# Patient Record
Sex: MALE | Race: WHITE | NOT HISPANIC OR LATINO | Employment: FULL TIME | URBAN - METROPOLITAN AREA
[De-identification: names, ages, dates, MRNs, and addresses within clinical notes are randomized per-mention and may not be internally consistent; named-entity substitution may affect disease eponyms.]

---

## 2019-02-22 ENCOUNTER — TRANSFERRED RECORDS (OUTPATIENT)
Dept: HEALTH INFORMATION MANAGEMENT | Facility: CLINIC | Age: 54
End: 2019-02-22

## 2019-02-22 ENCOUNTER — HOSPITAL ENCOUNTER (OUTPATIENT)
Facility: CLINIC | Age: 54
Setting detail: OBSERVATION
Discharge: NURSING, REHAB OR LONG-TERM CARE | End: 2019-02-26
Attending: FAMILY MEDICINE | Admitting: FAMILY MEDICINE
Payer: COMMERCIAL

## 2019-02-22 ENCOUNTER — APPOINTMENT (OUTPATIENT)
Dept: CT IMAGING | Facility: CLINIC | Age: 54
End: 2019-02-22
Attending: FAMILY MEDICINE
Payer: COMMERCIAL

## 2019-02-22 DIAGNOSIS — R07.9 CHEST PAIN, UNSPECIFIED TYPE: ICD-10-CM

## 2019-02-22 DIAGNOSIS — R07.9 CHEST PAIN: Primary | ICD-10-CM

## 2019-02-22 DIAGNOSIS — F14.10 COCAINE ABUSE (H): ICD-10-CM

## 2019-02-22 DIAGNOSIS — F19.10 POLYSUBSTANCE ABUSE (H): ICD-10-CM

## 2019-02-22 DIAGNOSIS — E66.2 OBESITY HYPOVENTILATION SYNDROME (H): ICD-10-CM

## 2019-02-22 DIAGNOSIS — F10.930 ALCOHOL WITHDRAWAL, UNCOMPLICATED (H): ICD-10-CM

## 2019-02-22 LAB
ALBUMIN SERPL-MCNC: 3.7 G/DL (ref 3.4–5)
ALP SERPL-CCNC: 88 U/L (ref 40–150)
ALT SERPL W P-5'-P-CCNC: 78 U/L (ref 0–70)
AMPHETAMINES UR QL SCN: NEGATIVE
ANION GAP SERPL CALCULATED.3IONS-SCNC: 5 MMOL/L (ref 3–14)
AST SERPL W P-5'-P-CCNC: 39 U/L (ref 0–45)
BARBITURATES UR QL: POSITIVE
BASOPHILS # BLD AUTO: 0 10E9/L (ref 0–0.2)
BASOPHILS NFR BLD AUTO: 0.5 %
BENZODIAZ UR QL: NEGATIVE
BILIRUB SERPL-MCNC: 0.3 MG/DL (ref 0.2–1.3)
BUN SERPL-MCNC: 22 MG/DL (ref 7–30)
CALCIUM SERPL-MCNC: 8.8 MG/DL (ref 8.5–10.1)
CANNABINOIDS UR QL SCN: NEGATIVE
CHLORIDE SERPL-SCNC: 105 MMOL/L (ref 94–109)
CO2 SERPL-SCNC: 29 MMOL/L (ref 20–32)
COCAINE UR QL: POSITIVE
CREAT SERPL-MCNC: 1.26 MG/DL (ref 0.66–1.25)
D DIMER PPP FEU-MCNC: 0.4 UG/ML FEU (ref 0–0.5)
DIFFERENTIAL METHOD BLD: NORMAL
EOSINOPHIL # BLD AUTO: 0.4 10E9/L (ref 0–0.7)
EOSINOPHIL NFR BLD AUTO: 4.3 %
ERYTHROCYTE [DISTWIDTH] IN BLOOD BY AUTOMATED COUNT: 13.2 % (ref 10–15)
GFR SERPL CREATININE-BSD FRML MDRD: 65 ML/MIN/{1.73_M2}
GLUCOSE SERPL-MCNC: 160 MG/DL (ref 70–99)
HCT VFR BLD AUTO: 49.4 % (ref 40–53)
HGB BLD-MCNC: 16.4 G/DL (ref 13.3–17.7)
IMM GRANULOCYTES # BLD: 0 10E9/L (ref 0–0.4)
IMM GRANULOCYTES NFR BLD: 0.3 %
LIPASE SERPL-CCNC: 175 U/L (ref 73–393)
LYMPHOCYTES # BLD AUTO: 2.1 10E9/L (ref 0.8–5.3)
LYMPHOCYTES NFR BLD AUTO: 23.4 %
MCH RBC QN AUTO: 30.8 PG (ref 26.5–33)
MCHC RBC AUTO-ENTMCNC: 33.2 G/DL (ref 31.5–36.5)
MCV RBC AUTO: 93 FL (ref 78–100)
MONOCYTES # BLD AUTO: 1 10E9/L (ref 0–1.3)
MONOCYTES NFR BLD AUTO: 11.1 %
NEUTROPHILS # BLD AUTO: 5.4 10E9/L (ref 1.6–8.3)
NEUTROPHILS NFR BLD AUTO: 60.4 %
NRBC # BLD AUTO: 0 10*3/UL
NRBC BLD AUTO-RTO: 0 /100
NT-PROBNP SERPL-MCNC: 6 PG/ML (ref 0–900)
OPIATES UR QL SCN: POSITIVE
PCP UR QL SCN: NEGATIVE
PLATELET # BLD AUTO: 201 10E9/L (ref 150–450)
POTASSIUM SERPL-SCNC: 3.8 MMOL/L (ref 3.4–5.3)
PROT SERPL-MCNC: 7.3 G/DL (ref 6.8–8.8)
RBC # BLD AUTO: 5.32 10E12/L (ref 4.4–5.9)
SODIUM SERPL-SCNC: 139 MMOL/L (ref 133–144)
TROPONIN I SERPL-MCNC: <0.015 UG/L (ref 0–0.04)
WBC # BLD AUTO: 8.9 10E9/L (ref 4–11)

## 2019-02-22 PROCEDURE — 85379 FIBRIN DEGRADATION QUANT: CPT | Performed by: FAMILY MEDICINE

## 2019-02-22 PROCEDURE — 25000132 ZZH RX MED GY IP 250 OP 250 PS 637: Performed by: FAMILY MEDICINE

## 2019-02-22 PROCEDURE — 25000128 H RX IP 250 OP 636: Performed by: FAMILY MEDICINE

## 2019-02-22 PROCEDURE — 85025 COMPLETE CBC W/AUTO DIFF WBC: CPT | Performed by: FAMILY MEDICINE

## 2019-02-22 PROCEDURE — 80053 COMPREHEN METABOLIC PANEL: CPT | Performed by: FAMILY MEDICINE

## 2019-02-22 PROCEDURE — C9113 INJ PANTOPRAZOLE SODIUM, VIA: HCPCS | Performed by: FAMILY MEDICINE

## 2019-02-22 PROCEDURE — 74177 CT ABD & PELVIS W/CONTRAST: CPT

## 2019-02-22 PROCEDURE — 99285 EMERGENCY DEPT VISIT HI MDM: CPT | Mod: 25 | Performed by: FAMILY MEDICINE

## 2019-02-22 PROCEDURE — 80307 DRUG TEST PRSMV CHEM ANLYZR: CPT | Performed by: FAMILY MEDICINE

## 2019-02-22 PROCEDURE — 93005 ELECTROCARDIOGRAM TRACING: CPT | Performed by: FAMILY MEDICINE

## 2019-02-22 PROCEDURE — 96365 THER/PROPH/DIAG IV INF INIT: CPT | Performed by: FAMILY MEDICINE

## 2019-02-22 PROCEDURE — 93010 ELECTROCARDIOGRAM REPORT: CPT | Mod: Z6 | Performed by: FAMILY MEDICINE

## 2019-02-22 PROCEDURE — 84484 ASSAY OF TROPONIN QUANT: CPT | Performed by: FAMILY MEDICINE

## 2019-02-22 PROCEDURE — 83880 ASSAY OF NATRIURETIC PEPTIDE: CPT | Performed by: FAMILY MEDICINE

## 2019-02-22 PROCEDURE — 83690 ASSAY OF LIPASE: CPT | Performed by: FAMILY MEDICINE

## 2019-02-22 PROCEDURE — G0378 HOSPITAL OBSERVATION PER HR: HCPCS

## 2019-02-22 PROCEDURE — 96375 TX/PRO/DX INJ NEW DRUG ADDON: CPT | Performed by: FAMILY MEDICINE

## 2019-02-22 PROCEDURE — 96366 THER/PROPH/DIAG IV INF ADDON: CPT | Performed by: FAMILY MEDICINE

## 2019-02-22 PROCEDURE — 25000125 ZZHC RX 250: Performed by: FAMILY MEDICINE

## 2019-02-22 PROCEDURE — 71260 CT THORAX DX C+: CPT

## 2019-02-22 RX ORDER — SUCRALFATE ORAL 1 G/10ML
1 SUSPENSION ORAL ONCE
Status: COMPLETED | OUTPATIENT
Start: 2019-02-22 | End: 2019-02-22

## 2019-02-22 RX ORDER — NITROGLYCERIN 20 MG/100ML
.07-2 INJECTION INTRAVENOUS CONTINUOUS
Status: DISCONTINUED | OUTPATIENT
Start: 2019-02-22 | End: 2019-02-23

## 2019-02-22 RX ORDER — NITROGLYCERIN 0.4 MG/1
0.4 TABLET SUBLINGUAL EVERY 5 MIN PRN
Status: DISCONTINUED | OUTPATIENT
Start: 2019-02-22 | End: 2019-02-26 | Stop reason: HOSPADM

## 2019-02-22 RX ORDER — KETOROLAC TROMETHAMINE 15 MG/ML
15 INJECTION, SOLUTION INTRAMUSCULAR; INTRAVENOUS ONCE
Status: COMPLETED | OUTPATIENT
Start: 2019-02-22 | End: 2019-02-22

## 2019-02-22 RX ORDER — ACETAMINOPHEN 500 MG
1000 TABLET ORAL ONCE
Status: COMPLETED | OUTPATIENT
Start: 2019-02-22 | End: 2019-02-22

## 2019-02-22 RX ORDER — MORPHINE SULFATE 4 MG/ML
4 INJECTION, SOLUTION INTRAMUSCULAR; INTRAVENOUS
Status: DISCONTINUED | OUTPATIENT
Start: 2019-02-22 | End: 2019-02-23

## 2019-02-22 RX ORDER — IOPAMIDOL 755 MG/ML
100 INJECTION, SOLUTION INTRAVASCULAR ONCE
Status: COMPLETED | OUTPATIENT
Start: 2019-02-22 | End: 2019-02-22

## 2019-02-22 RX ADMIN — SUCRALFATE 1 G: 1 SUSPENSION ORAL at 22:08

## 2019-02-22 RX ADMIN — NITROGLYCERIN 0.07 MCG/KG/MIN: 20 INJECTION INTRAVENOUS at 22:21

## 2019-02-22 RX ADMIN — KETOROLAC TROMETHAMINE 15 MG: 15 INJECTION, SOLUTION INTRAMUSCULAR; INTRAVENOUS at 22:07

## 2019-02-22 RX ADMIN — MORPHINE SULFATE 4 MG: 4 INJECTION INTRAVENOUS at 22:34

## 2019-02-22 RX ADMIN — SODIUM CHLORIDE 100 ML: 9 INJECTION, SOLUTION INTRAVENOUS at 23:40

## 2019-02-22 RX ADMIN — ACETAMINOPHEN 1000 MG: 500 TABLET, FILM COATED ORAL at 21:55

## 2019-02-22 RX ADMIN — NITROGLYCERIN 0.4 MG: 0.4 TABLET SUBLINGUAL at 21:57

## 2019-02-22 RX ADMIN — IOPAMIDOL 100 ML: 755 INJECTION, SOLUTION INTRAVENOUS at 23:40

## 2019-02-22 RX ADMIN — PANTOPRAZOLE SODIUM 40 MG: 40 INJECTION, POWDER, FOR SOLUTION INTRAVENOUS at 23:26

## 2019-02-22 ASSESSMENT — ENCOUNTER SYMPTOMS
VOMITING: 0
CONSTIPATION: 0
COUGH: 0
DYSURIA: 0
SINUS PRESSURE: 0
HEADACHES: 1
FREQUENCY: 0
WHEEZING: 0
ABDOMINAL PAIN: 0
CHILLS: 0
PALPITATIONS: 0
BLOOD IN STOOL: 0
FEVER: 0
DIARRHEA: 0
SHORTNESS OF BREATH: 1
SORE THROAT: 0
DIAPHORESIS: 0
NAUSEA: 0

## 2019-02-22 ASSESSMENT — MIFFLIN-ST. JEOR: SCORE: 2412.54

## 2019-02-23 ENCOUNTER — APPOINTMENT (OUTPATIENT)
Dept: GENERAL RADIOLOGY | Facility: CLINIC | Age: 54
End: 2019-02-23
Attending: INTERNAL MEDICINE
Payer: COMMERCIAL

## 2019-02-23 PROBLEM — R07.9 CHEST PAIN: Status: ACTIVE | Noted: 2019-02-23

## 2019-02-23 LAB
GLUCOSE BLDC GLUCOMTR-MCNC: 136 MG/DL (ref 70–99)
GLUCOSE BLDC GLUCOMTR-MCNC: 157 MG/DL (ref 70–99)
GLUCOSE BLDC GLUCOMTR-MCNC: 170 MG/DL (ref 70–99)
HBA1C MFR BLD: 7.1 % (ref 0–5.6)
MRSA DNA SPEC QL NAA+PROBE: NEGATIVE
SPECIMEN SOURCE: NORMAL
TROPONIN I SERPL-MCNC: <0.015 UG/L (ref 0–0.04)
TROPONIN I SERPL-MCNC: <0.015 UG/L (ref 0–0.04)

## 2019-02-23 PROCEDURE — 93005 ELECTROCARDIOGRAM TRACING: CPT

## 2019-02-23 PROCEDURE — 83036 HEMOGLOBIN GLYCOSYLATED A1C: CPT | Performed by: FAMILY MEDICINE

## 2019-02-23 PROCEDURE — 25000132 ZZH RX MED GY IP 250 OP 250 PS 637: Performed by: FAMILY MEDICINE

## 2019-02-23 PROCEDURE — 36415 COLL VENOUS BLD VENIPUNCTURE: CPT | Performed by: FAMILY MEDICINE

## 2019-02-23 PROCEDURE — 84484 ASSAY OF TROPONIN QUANT: CPT | Performed by: FAMILY MEDICINE

## 2019-02-23 PROCEDURE — 87640 STAPH A DNA AMP PROBE: CPT | Mod: XU | Performed by: FAMILY MEDICINE

## 2019-02-23 PROCEDURE — 25000125 ZZHC RX 250: Performed by: INTERNAL MEDICINE

## 2019-02-23 PROCEDURE — 99218 ZZC INITIAL OBSERVATION CARE,LEVL I: CPT | Performed by: FAMILY MEDICINE

## 2019-02-23 PROCEDURE — 99207 ZZC CDG-HISTORY COMP: MEETS EXP. PROBLEM FOCUSED-DOWN CODED LACK OF ROS: CPT | Performed by: FAMILY MEDICINE

## 2019-02-23 PROCEDURE — 25000128 H RX IP 250 OP 636: Performed by: FAMILY MEDICINE

## 2019-02-23 PROCEDURE — 25000132 ZZH RX MED GY IP 250 OP 250 PS 637: Performed by: INTERNAL MEDICINE

## 2019-02-23 PROCEDURE — G0378 HOSPITAL OBSERVATION PER HR: HCPCS

## 2019-02-23 PROCEDURE — 87641 MR-STAPH DNA AMP PROBE: CPT | Performed by: FAMILY MEDICINE

## 2019-02-23 PROCEDURE — 25000131 ZZH RX MED GY IP 250 OP 636 PS 637: Performed by: FAMILY MEDICINE

## 2019-02-23 PROCEDURE — 00000146 ZZHCL STATISTIC GLUCOSE BY METER IP

## 2019-02-23 PROCEDURE — 71045 X-RAY EXAM CHEST 1 VIEW: CPT

## 2019-02-23 RX ORDER — ACETAMINOPHEN 325 MG/1
650 TABLET ORAL ONCE
Status: COMPLETED | OUTPATIENT
Start: 2019-02-23 | End: 2019-02-23

## 2019-02-23 RX ORDER — TRIAMTERENE AND HYDROCHLOROTHIAZIDE 37.5; 25 MG/1; MG/1
1 CAPSULE ORAL EVERY MORNING
Status: DISCONTINUED | OUTPATIENT
Start: 2019-02-23 | End: 2019-02-23 | Stop reason: CLARIF

## 2019-02-23 RX ORDER — MULTIPLE VITAMINS W/ MINERALS TAB 9MG-400MCG
1 TAB ORAL DAILY
Status: DISCONTINUED | OUTPATIENT
Start: 2019-02-23 | End: 2019-02-26 | Stop reason: HOSPADM

## 2019-02-23 RX ORDER — HYDROXYZINE HYDROCHLORIDE 25 MG/1
25 TABLET, FILM COATED ORAL 4 TIMES DAILY PRN
COMMUNITY
End: 2023-11-21

## 2019-02-23 RX ORDER — METOPROLOL SUCCINATE 25 MG/1
25 TABLET, EXTENDED RELEASE ORAL DAILY
Status: DISCONTINUED | OUTPATIENT
Start: 2019-02-23 | End: 2019-02-23

## 2019-02-23 RX ORDER — LISINOPRIL 40 MG/1
40 TABLET ORAL DAILY
Status: DISCONTINUED | OUTPATIENT
Start: 2019-02-23 | End: 2019-02-26 | Stop reason: HOSPADM

## 2019-02-23 RX ORDER — NITROGLYCERIN 0.4 MG/1
0.4 TABLET SUBLINGUAL EVERY 5 MIN PRN
COMMUNITY
End: 2023-11-21

## 2019-02-23 RX ORDER — NALOXONE HYDROCHLORIDE 0.4 MG/ML
.1-.4 INJECTION, SOLUTION INTRAMUSCULAR; INTRAVENOUS; SUBCUTANEOUS
Status: DISCONTINUED | OUTPATIENT
Start: 2019-02-23 | End: 2019-02-26 | Stop reason: HOSPADM

## 2019-02-23 RX ORDER — LORAZEPAM 2 MG/ML
1-2 INJECTION INTRAMUSCULAR EVERY 30 MIN PRN
Status: DISCONTINUED | OUTPATIENT
Start: 2019-02-23 | End: 2019-02-26 | Stop reason: HOSPADM

## 2019-02-23 RX ORDER — LANOLIN ALCOHOL/MO/W.PET/CERES
100 CREAM (GRAM) TOPICAL DAILY
Status: DISCONTINUED | OUTPATIENT
Start: 2019-02-23 | End: 2019-02-26 | Stop reason: HOSPADM

## 2019-02-23 RX ORDER — LORAZEPAM 1 MG/1
1-2 TABLET ORAL EVERY 30 MIN PRN
Status: DISCONTINUED | OUTPATIENT
Start: 2019-02-23 | End: 2019-02-26 | Stop reason: HOSPADM

## 2019-02-23 RX ORDER — PANTOPRAZOLE SODIUM 40 MG/1
40 TABLET, DELAYED RELEASE ORAL DAILY
COMMUNITY
End: 2023-11-21

## 2019-02-23 RX ORDER — LORAZEPAM 1 MG/1
1 TABLET ORAL DAILY
COMMUNITY
End: 2023-11-21

## 2019-02-23 RX ORDER — TRIAMTERENE AND HYDROCHLOROTHIAZIDE 37.5; 25 MG/1; MG/1
1 CAPSULE ORAL EVERY MORNING
COMMUNITY

## 2019-02-23 RX ORDER — FOLIC ACID 1 MG/1
1 TABLET ORAL DAILY
Status: DISCONTINUED | OUTPATIENT
Start: 2019-02-23 | End: 2019-02-26 | Stop reason: HOSPADM

## 2019-02-23 RX ORDER — ONDANSETRON 2 MG/ML
4 INJECTION INTRAMUSCULAR; INTRAVENOUS EVERY 6 HOURS PRN
Status: DISCONTINUED | OUTPATIENT
Start: 2019-02-23 | End: 2019-02-26 | Stop reason: HOSPADM

## 2019-02-23 RX ORDER — METOPROLOL SUCCINATE 25 MG/1
25 TABLET, EXTENDED RELEASE ORAL DAILY
Status: COMPLETED | OUTPATIENT
Start: 2019-02-23 | End: 2019-02-23

## 2019-02-23 RX ORDER — NICOTINE POLACRILEX 4 MG
15-30 LOZENGE BUCCAL
Status: DISCONTINUED | OUTPATIENT
Start: 2019-02-23 | End: 2019-02-26 | Stop reason: HOSPADM

## 2019-02-23 RX ORDER — ACETAMINOPHEN 325 MG/1
650 TABLET ORAL EVERY 6 HOURS PRN
COMMUNITY
End: 2023-11-21

## 2019-02-23 RX ORDER — TRIAMTERENE/HYDROCHLOROTHIAZID 37.5-25 MG
1 TABLET ORAL EVERY MORNING
Status: DISCONTINUED | OUTPATIENT
Start: 2019-02-23 | End: 2019-02-26 | Stop reason: HOSPADM

## 2019-02-23 RX ORDER — NALOXONE HYDROCHLORIDE 0.4 MG/ML
.1-.4 INJECTION, SOLUTION INTRAMUSCULAR; INTRAVENOUS; SUBCUTANEOUS
Status: DISCONTINUED | OUTPATIENT
Start: 2019-02-23 | End: 2019-02-23

## 2019-02-23 RX ORDER — ALBUTEROL SULFATE 90 UG/1
2 AEROSOL, METERED RESPIRATORY (INHALATION) EVERY 6 HOURS PRN
Status: DISCONTINUED | OUTPATIENT
Start: 2019-02-23 | End: 2019-02-26 | Stop reason: HOSPADM

## 2019-02-23 RX ORDER — IPRATROPIUM BROMIDE AND ALBUTEROL SULFATE 2.5; .5 MG/3ML; MG/3ML
3 SOLUTION RESPIRATORY (INHALATION) 4 TIMES DAILY PRN
Status: DISCONTINUED | OUTPATIENT
Start: 2019-02-23 | End: 2019-02-26 | Stop reason: HOSPADM

## 2019-02-23 RX ORDER — ACETAMINOPHEN 325 MG/1
650 TABLET ORAL 2 TIMES DAILY PRN
Status: DISCONTINUED | OUTPATIENT
Start: 2019-02-23 | End: 2019-02-26 | Stop reason: HOSPADM

## 2019-02-23 RX ORDER — LABETALOL HYDROCHLORIDE 5 MG/ML
20 INJECTION, SOLUTION INTRAVENOUS EVERY 4 HOURS PRN
Status: DISCONTINUED | OUTPATIENT
Start: 2019-02-23 | End: 2019-02-26 | Stop reason: HOSPADM

## 2019-02-23 RX ORDER — METOPROLOL SUCCINATE 25 MG/1
TABLET, EXTENDED RELEASE ORAL DAILY
Status: ON HOLD | COMMUNITY
End: 2019-02-23

## 2019-02-23 RX ORDER — LANOLIN ALCOHOL/MO/W.PET/CERES
100 CREAM (GRAM) TOPICAL DAILY
COMMUNITY
End: 2023-11-21

## 2019-02-23 RX ORDER — LISINOPRIL 40 MG/1
40 TABLET ORAL DAILY
COMMUNITY

## 2019-02-23 RX ORDER — IBUPROFEN 400 MG/1
400 TABLET, FILM COATED ORAL ONCE
Status: COMPLETED | OUTPATIENT
Start: 2019-02-23 | End: 2019-02-23

## 2019-02-23 RX ORDER — PHENOBARBITAL 32.4 MG/1
97.2 TABLET ORAL
Status: ON HOLD | COMMUNITY
End: 2019-02-26

## 2019-02-23 RX ORDER — HYDROMORPHONE HYDROCHLORIDE 1 MG/ML
0.3 INJECTION, SOLUTION INTRAMUSCULAR; INTRAVENOUS; SUBCUTANEOUS
Status: DISCONTINUED | OUTPATIENT
Start: 2019-02-23 | End: 2019-02-23

## 2019-02-23 RX ORDER — DIPHENHYDRAMINE HCL 25 MG
25 CAPSULE ORAL
Status: DISCONTINUED | OUTPATIENT
Start: 2019-02-23 | End: 2019-02-26 | Stop reason: HOSPADM

## 2019-02-23 RX ORDER — IPRATROPIUM BROMIDE AND ALBUTEROL SULFATE 2.5; .5 MG/3ML; MG/3ML
SOLUTION RESPIRATORY (INHALATION)
Status: DISCONTINUED
Start: 2019-02-23 | End: 2019-02-23 | Stop reason: HOSPADM

## 2019-02-23 RX ORDER — ALBUTEROL SULFATE 90 UG/1
2 AEROSOL, METERED RESPIRATORY (INHALATION) EVERY 6 HOURS PRN
COMMUNITY
End: 2023-11-21

## 2019-02-23 RX ORDER — ONDANSETRON 4 MG/1
4 TABLET, ORALLY DISINTEGRATING ORAL EVERY 6 HOURS PRN
Status: DISCONTINUED | OUTPATIENT
Start: 2019-02-23 | End: 2019-02-26 | Stop reason: HOSPADM

## 2019-02-23 RX ORDER — DEXTROSE MONOHYDRATE 25 G/50ML
25-50 INJECTION, SOLUTION INTRAVENOUS
Status: DISCONTINUED | OUTPATIENT
Start: 2019-02-23 | End: 2019-02-26 | Stop reason: HOSPADM

## 2019-02-23 RX ADMIN — LORAZEPAM 2 MG: 1 TABLET ORAL at 08:53

## 2019-02-23 RX ADMIN — NITROGLYCERIN 0.07 MCG/KG/MIN: 20 INJECTION INTRAVENOUS at 02:43

## 2019-02-23 RX ADMIN — LORAZEPAM 2 MG: 2 INJECTION INTRAMUSCULAR; INTRAVENOUS at 18:12

## 2019-02-23 RX ADMIN — METOPROLOL SUCCINATE 25 MG: 25 TABLET, EXTENDED RELEASE ORAL at 07:56

## 2019-02-23 RX ADMIN — FOLIC ACID 1 MG: 1 TABLET ORAL at 07:56

## 2019-02-23 RX ADMIN — Medication 1 MG: at 22:12

## 2019-02-23 RX ADMIN — LORAZEPAM 1 MG: 1 TABLET ORAL at 07:56

## 2019-02-23 RX ADMIN — DIPHENHYDRAMINE HYDROCHLORIDE 25 MG: 25 CAPSULE ORAL at 22:12

## 2019-02-23 RX ADMIN — ACETAMINOPHEN 650 MG: 325 TABLET, FILM COATED ORAL at 06:29

## 2019-02-23 RX ADMIN — INSULIN ASPART 1 UNITS: 100 INJECTION, SOLUTION INTRAVENOUS; SUBCUTANEOUS at 12:06

## 2019-02-23 RX ADMIN — ACETAMINOPHEN 650 MG: 325 TABLET, FILM COATED ORAL at 18:53

## 2019-02-23 RX ADMIN — TRIAMTERENE AND HYDROCHLOROTHIAZIDE 1 TABLET: 37.5; 25 TABLET ORAL at 09:21

## 2019-02-23 RX ADMIN — LISINOPRIL 40 MG: 40 TABLET ORAL at 07:56

## 2019-02-23 RX ADMIN — Medication 100 MG: at 07:56

## 2019-02-23 RX ADMIN — IBUPROFEN 400 MG: 400 TABLET ORAL at 09:21

## 2019-02-23 RX ADMIN — MULTIPLE VITAMINS W/ MINERALS TAB 1 TABLET: TAB at 07:55

## 2019-02-23 RX ADMIN — IPRATROPIUM BROMIDE AND ALBUTEROL SULFATE 3 ML: .5; 3 SOLUTION RESPIRATORY (INHALATION) at 18:30

## 2019-02-23 ASSESSMENT — ACTIVITIES OF DAILY LIVING (ADL)
ADLS_ACUITY_SCORE: 11
RETIRED_COMMUNICATION: 0-->UNDERSTANDS/COMMUNICATES WITHOUT DIFFICULTY
FALL_HISTORY_WITHIN_LAST_SIX_MONTHS: NO
TOILETING: 0-->INDEPENDENT
ADLS_ACUITY_SCORE: 10
ADLS_ACUITY_SCORE: 11
TRANSFERRING: 0-->INDEPENDENT
SWALLOWING: 0-->SWALLOWS FOODS/LIQUIDS WITHOUT DIFFICULTY
DRESS: 0-->INDEPENDENT
RETIRED_EATING: 0-->INDEPENDENT
COGNITION: 0 - NO COGNITION ISSUES REPORTED
AMBULATION: 0-->INDEPENDENT
BATHING: 0-->INDEPENDENT

## 2019-02-23 ASSESSMENT — PAIN DESCRIPTION - DESCRIPTORS
DESCRIPTORS: POUNDING
DESCRIPTORS: HEADACHE
DESCRIPTORS: HEADACHE
DESCRIPTORS: ACHING

## 2019-02-23 ASSESSMENT — MIFFLIN-ST. JEOR: SCORE: 2405

## 2019-02-23 NOTE — PROGRESS NOTES
[facility] [facility] ADMISSION NOTE    Patient admitted to room 5 at approximately 0115 via cart from emergency room. Patient was accompanied by nurse.     Verbal SBAR report received from Sina YEN in ER prior to patient arrival.     Patient trasferred to bed via self. Patient alert and oriented X 3. Pain is not well controlled.  Medication(s) being used: narcotic analgesics including morphine. 0-10 Pain Scale: 3. Admission vital signs: Blood pressure 124/85, pulse 78, temperature 97.8  F (36.6  C), temperature source Oral, resp. rate 15, height 1.829 m (6'), weight (!) 151.2 kg (333 lb 5.4 oz), SpO2 98 %. Patient was oriented to plan of care, call light, bed controls, tv, telephone, bathroom and visiting hours.     Risk Assessment    The following safety risks were identified during admission: fall. Yellow risk band applied: YES.     Skin Initial Assessment    This writer admitted this patient and completed a full skin assessment and Flo score in the Adult PCS flowsheet. Appropriate interventions initiated as needed.     Secondary skin check completed by Crispin Bryant RN.    Flo Risk Assessment  Sensory Perception: 4-->no impairment  Moisture: 3-->occasionally moist  Activity: 3-->walks occasionally  Mobility: 3-->slightly limited  Nutrition: 3-->adequate  Friction and Shear: 3-->no apparent problem  Flo Score: 19    Maricruz Cronin RN

## 2019-02-23 NOTE — PROGRESS NOTES
Return call from Dr Boogie: turn off Nitroglycerin drip and give 650mg Tylenol PO once for headache. If not resolving, may give 0.3mg IV Dilaudid Q 2 hours for headache.

## 2019-02-23 NOTE — PLAN OF CARE
Afebrile, room air. Audible few second pauses in breath with sleep, patient states he has sleep apnea but does not use his cpap at home. Had complaints of severe headache shortly after arrival to unit with denial of chest pain; order from Dr Boogie to stop drip and given Tylenol to see if HA improves. HA improved and half hour later patient was feeling short of breath and pressure in chest, lung sounds clear and vitals stable. Nitroglycerin drip restarted on lowest dose at 0245; chest pressure resolved and no further complaints of HA at this time. Troponin level being rechecked this morning. Sinus rhythm on cardiac monitors throughout the night. Patient tolerating regular diet. Up at bedside with SBA. His plan upon discharge will be to return to [facility]; states he is committed to rehab this time.

## 2019-02-23 NOTE — ED NOTES
From [place] with c/o sob and chest pain. Started at 1700 tonight. Ems gave 4 81mg asa and 4 ntg sl sprays. Rates pressure is 7/10.

## 2019-02-23 NOTE — PROGRESS NOTES
Patient complains of pounding headache, lightheaded and dizzy. Denies chest pain at this time. Paging Dr Boogie for Tylenol order and to see if able to try turning off Nitroglycerin drip. Awaiting orders

## 2019-02-23 NOTE — UTILIZATION REVIEW
"    Admission Status; Secondary Review Determination      Under the authority of the Utilization Management Committee, the utilization review process indicated a secondary review on the above patient. The review outcome is based on review of the medical records, discussions with staff, and applying clinical experience noted on the date of the review.      (X) Observation Status Appropriate - This patient does not meet hospital inpatient criteria and is placed in observation status. If this patient's primary payer is Medicare and was admitted as an inpatient, Condition Code 44 should be used and patient status changed to \"observation\".      RATIONALE FOR DETERMINATION:   53-year-old male with a history of hypertension, gastritis, obstructive sleep apnea with noncompliance of CPAP, obesity hypoventilation, impaired fasting glucose, ascending aortic aneurysm and previous hypertension, hypertensive emergency.  The patient has been using cocaine and alcohol heavily.  He presented to [facility] for treatment and several hours after arriving he developed chest pain.  He was seen in the [place] ER with work up including normal ECG, normal troponin enzyme, and unremarkable chest CT scan.  He was treated with a nitroglycerin gtt in the ER, which was subsequently stopped on admission.  Urine tox screen notable for presence of cocaine and opiates (as well as barbiturates, which he received at [facility] prior to presentation)         The severity of illness, intensity of service provided, expected LOS and risk for adverse outcome make the care appropriate for further observation; however, doesn't meet criteria for hospital inpatient admission. This was communicated to Dr. Boogie who concurred with this determination.     The information on this document is developed by the utilization review team in order for the business office to ensure compliance. This only denotes the appropriateness of proper admission status and does not " reflect the quality of care rendered.      The definitions of Inpatient Status and Observation Status used in making the determination above are those provided in the CMS Coverage Manual, Chapter 1 and Chapter 6, section 70.4.      Sincerely,      Carlos Perez MD  Physician Advisor  Utilization Management  [facility]

## 2019-02-23 NOTE — PROVIDER NOTIFICATION
Dr. Chuyita molina. Pt c/o increased shortness of breath at rest, starting to have chest discomfort not pain but pressure. Headache increasing, medicated with prn tylenol and prn ativan with no change. CIWA 8 mostly for headache. Vital signs:  Temp: 98.1  F (36.7  C) Temp src: Oral BP: (!) 136/92 Pulse: 81 Heart Rate: 71 Resp: 8 SpO2: 98 % RA.   Nitroglycerin drip currently off. Waiting for return call.

## 2019-02-23 NOTE — PROGRESS NOTES
"Patient states HA gone since nitroglycerin drip off; however feeling short of breath and pressure when taking deep breaths. Asked patient about history of asthma in chart from [place], states \"I had an inhaler that was 3 years old so I threw it away, it was only for if I had a wheeze at night time.\" Asked if he has sleep apnea, states \"yes, but I don't use my CPAP.\"  Nitroglycerin drip restarted at lowest dose at 2:44AM. Will continue to monitor closely.   "

## 2019-02-23 NOTE — H&P
Admitted:     02/22/2019      CHIEF COMPLAINT:  Chest pressure and shortness of breath starting 8 hours after admission to [facility].      HISTORY OF PRESENT ILLNESS:  Mikey Sanders is a 53-year-old male with a history of hypertension, gastritis, obstructive sleep apnea with noncompliance of CPAP, obesity hypoventilation, impaired fasting glucose, ascending aortic aneurysm and previous hypertension, hypertensive emergency.  The patient has been using cocaine and alcohol heavily.  He has been through treatment once in the past 4-5 years ago in [place].  He lives in [place].  He has a strong family history of cardiac disease.  He decided that he wanted to get off chemicals.  He presented to [facility] 48 hours after admission.  He developed dyspnea and chest pressure, some occasional sweats but no nausea.  He had been given phenobarbital.  He was brought to the emergency room.      In the emergency room, he had a normal chest x-ray.  Troponins have been normal.  His pain responded to nitroglycerin.  He was put on a nitroglycerin drip and admitted.  So far his troponins are normal.  During the night, he developed a headache.  His nitroglycerin drip was on the level, but it was turned off.  The headache resolved, but he developed more feelings of shortness of breath.  The nitroglycerin was turned back on and he thought that that helped.  He is currently symptom-free but he does have a lot of anxiety.  His tox screen was positive for opiates, barbiturates and cocaine.  He also reports heavy drinking.  At this point in time, we are turning off the nitroglycerin and going to observe the patient.  He had a CT done upon admission that showed no dissection.  There was no report of aneurysm on that, although he does report this.      PAST MEDICAL HISTORY:      1.  Gastritis 4/esophagitis.     2.  Acute kidney injury.     3.  Left facial numbness.     4.  Headaches.      5.  Glucose intolerance.     6.  Panic.      7.   Obesity hypoventilation.      8.  Obstructive sleep apnea.  He reports he is not using the CPAP but has used it in the past.      9.  Ascending aortic aneurysm.      10.  Obesity.       11.  Essential hypertension.      12.  Hypertensive emergency.      13.  Unspecified iridocyclitis.      14.  Glaucoma.     15.  Carpal tunnel syndrome.     16.  Costal chondritis.      PAST SURGICAL HISTORY:  Includes some type of abdominal surgery, appendectomy, right hand fracture surgeries, hernia repair, joint replacement, tonsillectomy, elbow surgery, upper GI endoscopy.      FAMILY MEDICAL HISTORY:  Father with Barnard's esophagus.  Brother with Barnard's esophagus.  All grandparents with coronary disease.      SOCIAL HISTORY:  Lives in [place].  He has children.  He is not .  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      HABITS:  He was drinking heavily up until Tuesday of this last week.  He was using cocaine up until Tuesday of this last week, which was about 3-4 days ago.      ALLERGIES:  NKA.          CODE STATUS:  Full.         MEDICATIONS:      1.  Lisinopril 40 mg daily.      2.  Ativan 1 mg daily.      3.  Metoprolol succinate 25 mg daily.      4.  Dyazide 37.5/25 one daily.      5.  Albuterol HFA 2 puffs q.6h. p.r.n.      REVIEW OF SYSTEMS:  He has had headache when the nitroglycerin has been on.  No other head and neck symptoms.  He does report anxiety.  He has had shortness of breath, recent dyspnea on exertion and dyspnea on exertion with stair climbing in the last week.  He has had low level chest pressure in the central chest.  He did report it to be 7/8 in intensity in the emergency room.  No abdominal symptoms.  No GERD symptoms.  No urinary tract symptoms.  No skin symptoms.  He has right-hand symptoms from fracture and fracture surgeries there.      PHYSICAL EXAMINATION:      GENERAL:  He is pleasant, obese, alert.      VITAL SIGNS:  Temperature is 98, pulse 81, sinus, blood pressure 136/92, respiratory  rate 12, sat 98%.  He seems somewhat nervous.      HEENT:  Ears negative.  Oral negative.  Eyes:  Pupils round, react to light.      NECK:  Supple.  There are no meningeal signs.   LUNGS:  Clear to auscultation.      CARDIOVASCULAR:  S1, S2, without click, rub or murmur.      ABDOMEN:  Obese, soft, benign, nontender, without guarding, rebound, rigidity or mass.  Genitalia grossly normal.      EXTREMITIES:  Trace pitting edema.      NEUROLOGIC:  Cranial nerves, motor, reflexes all within normal limits.      LABORATORY DATA:  EKG was normal.  CBC normal, white count 8.9, hemoglobin 16.4, platelet count 201,000.  Comprehensive metabolic panel notable for glucose of 116, a creatinine of 1.26, both troponins less than 0.015.  D-dimer 0.4.  BNP was 6.  Drug abuse screen positive for barbiturates (presumably from phenobarbital given at [facility]), cocaine and opiates.  Hemoglobin A1c is pending.         ASSESSMENT:   1.  Chest pain in a patient with multiple risk factors.  The chest pain has been relieved with nitroglycerin.  Differential diagnosis includes ischemic coronary disease, cocaine chest pain syndrome, chest pain related to polysubstance drug abuse and withdrawal syndrome, anxiety and gastrointestinal related chest pain.      2.  History of hypertension.      3.  History of thoracic aneurysm -- not documented on our CT.      4.  Elevated glucose in a patient with a history of glucose intolerance.      5.  Obesity.      6.  Polysubstance drug abuse.       7.  Sleep apnea -- not compliant with CPAP use.      8.  Elevated ALT -- in part related to alcohol use.      9.  Chronic kidney disease, baseline creatinine has been between 1.2 and 1.8.         PLAN:  The patient needs to stay in the hospital because he has had ongoing chest pain.  It has been nitroglycerin sensitive.  At this time, I am going to turn the nitroglycerin back off.  It has been causing him a headache.  I am going to watch him.  I think he may need  a stress test before he discharges.  The differential for the chest pain is broad and includes ischemic disease, chest pain related to cocaine use, chest pain related to polysubstance use, withdrawal, anxiety related chest pain and GI related chest pain.  He does have risk factors and the pain has been nitroglycerin sensitive so I think he will probably need to stay here until he can have a stress test which would be on Monday.  I am going to put him on a CIWA protocol and give him vitamins.  Put him on an insulin order set and will obtain an A1c.  Will recheck a troponin tomorrow.         EMERY MYLES MD             D: 2019   T: 2019   MT: RADHA      Name:     GALINA COFFEY   MRN:      [id number]        Account:      [id number]   :      1965        Admitted:     2019                   Document: [id number]

## 2019-02-23 NOTE — ED PROVIDER NOTES
History     Chief Complaint   Patient presents with     Chest Pain     Shortness of Breath     HPI  Mikey Sanders is a 53 year old male who presents from [facility] with anterior chest pain moderate to severe intensity 7-8 out of 10 without radiation into his back jaw or arms.  There is associated shortness of breath, no nausea vomiting.  Some sweats .  He also notes dyspnea on exertion that is been ongoing for the last couple of days.  No history of VT E or risk factors.  History of a known thoracic aortic aneurysm of 3.9 cm.  No known diabetes heart disease lung disease hyperlipidemia  Is have a history of hypertension.    Denies recent prolonged travel (>3 hours) by car or plane, history or FHx of venous thromboembolism, recent surgery (last 4 weeks), active cancer history, hypercoagulable state, estrogen or other medications/conditions causing VTE or  new unilateral swelling or pain in the legs or calves.    Alcohol withdrawal protocol, on phenobarbital.      Resents here with moderate headache related to the nitroglycerin given in transport.    With history of alcohol abuse and cocaine abuse.  Last use was 2/22/2019.  Uses up to 750 mL vodka couple times per week.      Allergies:  No Known Allergies    Problem List:    There are no active problems to display for this patient.       Past Medical History:    Past Medical History:   Diagnosis Date     Essential hypertension        Past Surgical History:    Past Surgical History:   Procedure Laterality Date     ELBOW SURGERY      Humerus/Elbow Procedure-ulnar nerve repair     FINGER SURGERY      Hand/Finger Procedure-x4       Family History:    No family history on file.    Social History:  Marital Status:   [2]  Social History     Tobacco Use     Smoking status: Never Smoker     Smokeless tobacco: Never Used   Substance Use Topics     Alcohol use: Yes     Alcohol/week: 0.0 oz     Comment: Alcoholic Drinks/day: very rare; once a year     Drug use:  Unknown     Types: Other     Comment: Drug use: Not Asked        Medications:      No current outpatient medications on file.      Review of Systems   Constitutional: Negative for chills, diaphoresis and fever.   HENT: Negative for ear pain, sinus pressure and sore throat.    Eyes: Negative for visual disturbance.   Respiratory: Positive for shortness of breath. Negative for cough and wheezing.    Cardiovascular: Positive for chest pain. Negative for palpitations.   Gastrointestinal: Negative for abdominal pain, blood in stool, constipation, diarrhea, nausea and vomiting.   Genitourinary: Negative for dysuria, frequency and urgency.   Skin: Negative for rash.   Neurological: Positive for headaches.   All other systems reviewed and are negative.        Physical Exam   BP: (!) 165/107  Pulse: 90  Temp: 98.3  F (36.8  C)  Resp: 22  SpO2: 94 %      Physical Exam   Constitutional: He is oriented to person, place, and time. He appears distressed.   HENT:   Mouth/Throat: Oropharynx is clear and moist.   Neck: Neck supple.   Cardiovascular: Normal rate, regular rhythm and normal heart sounds. Exam reveals no friction rub.   No murmur heard.  Pulmonary/Chest: Effort normal and breath sounds normal. No stridor. No respiratory distress. He has no wheezes.   Abdominal: Soft. Bowel sounds are normal. He exhibits no distension. There is no tenderness. There is no guarding.   Musculoskeletal: He exhibits no edema.   Neurological: He is alert and oriented to person, place, and time. No cranial nerve deficit or sensory deficit. He exhibits normal muscle tone. Coordination normal.   Skin: No rash noted. He is not diaphoretic. No pallor.       ED Course        Procedures                 EKG Interpretation:      Interpreted by Carlos Alamo MD  EKG done at 2131 hrs. demonstrates a sinus rhythm at 85 bpm with a leftward axis and no ST change.  No T wave changes.  Normal R progression and no Q waves.  Normal intervals.  Normal  conduction.  No ectopy.  Impression sinus rhythm at 85 bpm and no old EKG to compare.      Critical Care time:  none               Results for orders placed or performed during the hospital encounter of 02/22/19 (from the past 24 hour(s))   CBC with platelets, differential   Result Value Ref Range    WBC 8.9 4.0 - 11.0 10e9/L    RBC Count 5.32 4.4 - 5.9 10e12/L    Hemoglobin 16.4 13.3 - 17.7 g/dL    Hematocrit 49.4 40.0 - 53.0 %    MCV 93 78 - 100 fl    MCH 30.8 26.5 - 33.0 pg    MCHC 33.2 31.5 - 36.5 g/dL    RDW 13.2 10.0 - 15.0 %    Platelet Count 201 150 - 450 10e9/L    Diff Method Automated Method     % Neutrophils 60.4 %    % Lymphocytes 23.4 %    % Monocytes 11.1 %    % Eosinophils 4.3 %    % Basophils 0.5 %    % Immature Granulocytes 0.3 %    Nucleated RBCs 0 0 /100    Absolute Neutrophil 5.4 1.6 - 8.3 10e9/L    Absolute Lymphocytes 2.1 0.8 - 5.3 10e9/L    Absolute Monocytes 1.0 0.0 - 1.3 10e9/L    Absolute Eosinophils 0.4 0.0 - 0.7 10e9/L    Absolute Basophils 0.0 0.0 - 0.2 10e9/L    Abs Immature Granulocytes 0.0 0 - 0.4 10e9/L    Absolute Nucleated RBC 0.0    Comprehensive metabolic panel   Result Value Ref Range    Sodium 139 133 - 144 mmol/L    Potassium 3.8 3.4 - 5.3 mmol/L    Chloride 105 94 - 109 mmol/L    Carbon Dioxide 29 20 - 32 mmol/L    Anion Gap 5 3 - 14 mmol/L    Glucose 160 (H) 70 - 99 mg/dL    Urea Nitrogen 22 7 - 30 mg/dL    Creatinine 1.26 (H) 0.66 - 1.25 mg/dL    GFR Estimate 65 >60 mL/min/[1.73_m2]    GFR Estimate If Black 76 >60 mL/min/[1.73_m2]    Calcium 8.8 8.5 - 10.1 mg/dL    Bilirubin Total 0.3 0.2 - 1.3 mg/dL    Albumin 3.7 3.4 - 5.0 g/dL    Protein Total 7.3 6.8 - 8.8 g/dL    Alkaline Phosphatase 88 40 - 150 U/L    ALT 78 (H) 0 - 70 U/L    AST 39 0 - 45 U/L   Troponin I   Result Value Ref Range    Troponin I ES <0.015 0.000 - 0.045 ug/L   D dimer quantitative   Result Value Ref Range    D Dimer 0.4 0.0 - 0.50 ug/ml FEU   Nt probnp inpatient   Result Value Ref Range    N-Terminal  Pro BNP Inpatient 6 0 - 900 pg/mL   Lipase   Result Value Ref Range    Lipase 175 73 - 393 U/L   Drug Screen Urine   Result Value Ref Range    Amphetamine Qual Urine Negative NEG^Negative    Barbiturates Qual Urine Positive (A) NEG^Negative    Benzodiazepine Qual Urine Negative NEG^Negative    Cannabinoids Qual Urine Negative NEG^Negative    Cocaine Qual Urine Positive (A) NEG^Negative    Opiates Qualitative Urine Positive (A) NEG^Negative    PCP Qual Urine Negative NEG^Negative   CT Aortic Survey w Contrast    Narrative    CT AORTIC SURVEY W CONTRAST 2/22/2019 11:53 PM    HISTORY: History of aortic aneurysm. Severe anterior chest pain.    COMPARISON: None.    TECHNIQUE:  Chest, abdomen and pelvis CT was performed following  intravenous administration of 100 mL Isovue -370.  Radiation dose for  this scan was reduced using automated exposure control, adjustment of  the mA and/or kV according to patient size, or iterative  reconstruction technique.    FINDINGS:     CHEST: No evidence of aortic dissection. No evidence of segmental or  subsegmental pulmonary embolus. Normal heart size. No pericardial  effusion. No adenopathy. No airspace consolidation, pleural effusion  or pneumothorax.    ABDOMEN: No evidence of aortic dissection. Liver, gallbladder, spleen,  pancreas, left adrenal gland and kidneys appear normal. Small right  adrenal nodule probably represents benign adenoma.    PELVIS: Caliber of the iliac vasculature is normal. Bowel has normal  caliber. There are multiple prominent iliac and inguinal lymph nodes;  for reference a right iliac node measures 2.9 cm in short axis (image  195, series 5).      Impression    IMPRESSION:   1. No evidence of aortic dissection.  2. No evidence of pulmonary embolus.  3. No acute abnormality in the abdomen/pelvis.  4. Enlarged iliac and inguinal lymph nodes of uncertain clinical  significance.    TARA PUGA MD       Medications   acetaminophen (TYLENOL) tablet 1,000 mg (not  administered)   nitroGLYcerin (NITROSTAT) sublingual tablet 0.4 mg (not administered)   morphine (PF) injection 4 mg (not administered)   sucralfate (CARAFATE) suspension 1 g (not administered)       Assessments & Plan (with Medical Decision Making)     MDM: Mikey Sanders is a 53 year old male who presents from [facility] after being there for only approximately 8 hours with onset at approximately 1700 hrs. of anterior chest pain without radiation associated dyspnea.  He tells me that his last cocaine use was approximately 18 February but he is still positive on his urine drug screen for this.  He also notes his last alcohol use was approximately 18th as well.  He has been on withdrawal protocol there including with barbiturates.  He has a history of a thoracic aortic aneurysm of 3.9 cm.  He also has been experiencing associated dyspnea and sweats.  There is been an increased sensation of dyspnea on exertion.  He had no known history of VT E or such risk factors and a d-dimer was negative.  He was sent to CT of the chest abdomen pelvis for an aortic study that demonstrated no obvious dissection.  His troponin is done in approximately 5 hours of pain and it is normal.  His EKG demonstrates no acute changes.  On his presentation he had received aspirin 324 mg in the ambulance.  Nitroglycerin had been briefly helpful in the ambulance.  This however gave him a headache.  On his presentation to the emergency department he was also given nitroglycerin with bring his pain from a 7 to a 3.  He was started on a nitroglycerin infusion while awaiting further testing.    At this point his differential diagnosis includes coronary syndrome, pulmonary embolism, aortic dissection, pneumonia, gastroesophageal reflux disease, pancreatitis, reactive airway disease.  I discussed with the patient my concerns for cocaine induced coronary syndrome I therefore asked that he be observed overnight with serial troponins.  Would recommend  weaning of the nitroglycerin as possible and he will be in a monitored bed in the ICU for this.  I have also started GI prophylaxis with Protonix and given a test dose of Carafate.  I discussed with Bill and hospitalist service will except for observation.         ED to Inpatient Handoff:    Discussed with Bill  Patient accepted for Observation Stay  Pending studies include CTA aorta study  Code Status: Full Code           I have reviewed the nursing notes.    I have reviewed the findings, diagnosis, plan and need for follow up with the patient.          Medication List      There are no discharge medications for this visit.         Final diagnoses:   Chest pain   Cocaine abuse (H)   Alcohol withdrawal, uncomplicated (H)       2/22/2019   [facility] EMERGENCY DEPARTMENT     Carlos Alamo MD  02/23/19 0134

## 2019-02-23 NOTE — ED NOTES
"Patient has  [place] to Observation  order. Patient has been given the Observation brochure -  What does Observation mean to me.\"  Patient has been given the opportunity to ask questions about observation status and their plan of care.      Sara Hitchcock    "

## 2019-02-24 LAB
ANION GAP SERPL CALCULATED.3IONS-SCNC: 5 MMOL/L (ref 3–14)
BUN SERPL-MCNC: 19 MG/DL (ref 7–30)
CALCIUM SERPL-MCNC: 8.4 MG/DL (ref 8.5–10.1)
CHLORIDE SERPL-SCNC: 107 MMOL/L (ref 94–109)
CO2 SERPL-SCNC: 28 MMOL/L (ref 20–32)
CREAT SERPL-MCNC: 1.04 MG/DL (ref 0.66–1.25)
ERYTHROCYTE [DISTWIDTH] IN BLOOD BY AUTOMATED COUNT: 13.2 % (ref 10–15)
GFR SERPL CREATININE-BSD FRML MDRD: 83 ML/MIN/{1.73_M2}
GLUCOSE BLDC GLUCOMTR-MCNC: 120 MG/DL (ref 70–99)
GLUCOSE BLDC GLUCOMTR-MCNC: 132 MG/DL (ref 70–99)
GLUCOSE BLDC GLUCOMTR-MCNC: 141 MG/DL (ref 70–99)
GLUCOSE BLDC GLUCOMTR-MCNC: 174 MG/DL (ref 70–99)
GLUCOSE SERPL-MCNC: 153 MG/DL (ref 70–99)
HCT VFR BLD AUTO: 46.1 % (ref 40–53)
HGB BLD-MCNC: 15.4 G/DL (ref 13.3–17.7)
MCH RBC QN AUTO: 31.2 PG (ref 26.5–33)
MCHC RBC AUTO-ENTMCNC: 33.4 G/DL (ref 31.5–36.5)
MCV RBC AUTO: 93 FL (ref 78–100)
PLATELET # BLD AUTO: 154 10E9/L (ref 150–450)
POTASSIUM SERPL-SCNC: 4.1 MMOL/L (ref 3.4–5.3)
RBC # BLD AUTO: 4.94 10E12/L (ref 4.4–5.9)
SODIUM SERPL-SCNC: 140 MMOL/L (ref 133–144)
TROPONIN I SERPL-MCNC: <0.015 UG/L (ref 0–0.04)
WBC # BLD AUTO: 6.5 10E9/L (ref 4–11)

## 2019-02-24 PROCEDURE — 36415 COLL VENOUS BLD VENIPUNCTURE: CPT | Performed by: FAMILY MEDICINE

## 2019-02-24 PROCEDURE — 25000128 H RX IP 250 OP 636: Performed by: INTERNAL MEDICINE

## 2019-02-24 PROCEDURE — 25000132 ZZH RX MED GY IP 250 OP 250 PS 637: Performed by: FAMILY MEDICINE

## 2019-02-24 PROCEDURE — 00000146 ZZHCL STATISTIC GLUCOSE BY METER IP

## 2019-02-24 PROCEDURE — 25000125 ZZHC RX 250: Performed by: INTERNAL MEDICINE

## 2019-02-24 PROCEDURE — 85027 COMPLETE CBC AUTOMATED: CPT | Performed by: FAMILY MEDICINE

## 2019-02-24 PROCEDURE — G0378 HOSPITAL OBSERVATION PER HR: HCPCS

## 2019-02-24 PROCEDURE — 80048 BASIC METABOLIC PNL TOTAL CA: CPT | Performed by: FAMILY MEDICINE

## 2019-02-24 PROCEDURE — 25000132 ZZH RX MED GY IP 250 OP 250 PS 637: Performed by: INTERNAL MEDICINE

## 2019-02-24 PROCEDURE — 84484 ASSAY OF TROPONIN QUANT: CPT | Performed by: FAMILY MEDICINE

## 2019-02-24 PROCEDURE — 99226 ZZC SUBSEQUENT OBSERVATION CARE,LEVEL III: CPT | Performed by: FAMILY MEDICINE

## 2019-02-24 RX ADMIN — PROCHLORPERAZINE EDISYLATE 10 MG: 5 INJECTION INTRAMUSCULAR; INTRAVENOUS at 18:08

## 2019-02-24 RX ADMIN — FOLIC ACID 1 MG: 1 TABLET ORAL at 07:46

## 2019-02-24 RX ADMIN — IPRATROPIUM BROMIDE AND ALBUTEROL SULFATE 3 ML: .5; 3 SOLUTION RESPIRATORY (INHALATION) at 16:42

## 2019-02-24 RX ADMIN — IPRATROPIUM BROMIDE AND ALBUTEROL SULFATE 3 ML: .5; 3 SOLUTION RESPIRATORY (INHALATION) at 06:41

## 2019-02-24 RX ADMIN — Medication 100 MG: at 07:46

## 2019-02-24 RX ADMIN — LORAZEPAM 1 MG: 1 TABLET ORAL at 16:43

## 2019-02-24 RX ADMIN — INSULIN ASPART 1 UNITS: 100 INJECTION, SOLUTION INTRAVENOUS; SUBCUTANEOUS at 08:36

## 2019-02-24 RX ADMIN — ACETAMINOPHEN 650 MG: 325 TABLET, FILM COATED ORAL at 06:42

## 2019-02-24 RX ADMIN — TRIAMTERENE AND HYDROCHLOROTHIAZIDE 1 TABLET: 37.5; 25 TABLET ORAL at 07:46

## 2019-02-24 RX ADMIN — MULTIPLE VITAMINS W/ MINERALS TAB 1 TABLET: TAB at 07:46

## 2019-02-24 RX ADMIN — ACETAMINOPHEN 650 MG: 325 TABLET, FILM COATED ORAL at 16:43

## 2019-02-24 RX ADMIN — LISINOPRIL 40 MG: 40 TABLET ORAL at 07:45

## 2019-02-24 RX ADMIN — LORAZEPAM 2 MG: 1 TABLET ORAL at 18:08

## 2019-02-24 NOTE — PROVIDER NOTIFICATION
Dr. Rm called to discuss pt's status. This writer requesting more tylenol for pt's headache; tylenol fell off the MAR. MD ordered BID prn tylenol because of pt's elevated ALT. No NSAIDs because of gastric issues. Pt sitting at bedside after neb and ativan starting calm down somewhat. Waiting for xray. Pt updated on plan.

## 2019-02-24 NOTE — PROVIDER NOTIFICATION
Patient with noted audible expiratory wheezing , anxiety with H/A of 10/10 blood pressure 143/109 sat's 100% room air Patient had been in lounge on Internet when this started.

## 2019-02-24 NOTE — PROGRESS NOTES
Cross cover     Called by nurse as pt had ongoing c/o sob. She said he was wheezing which was new. Ordered duonebs and rechecked cxr which was normal.    Nichelle Rm

## 2019-02-24 NOTE — PROGRESS NOTES
[facility]      Subjective:  Pt reports low grade chest tightness and ongoing difficulty breathing and ashraf.  He is anxious.  No swelling  No fever chills  Nurses reported wheezing-he thought neb helped some    Review of Systems:  CONSTITUTIONAL: NEGATIVE for fever, chills, change in weight  INTEGUMENTARY/SKIN: NEGATIVE for worrisome rashes, moles or lesions  EYES: NEGATIVE for vision changes or irritation  ENT/MOUTH: NEGATIVE for ear, mouth and throat problems  RESP:above  BREAST: NEGATIVE for masses, tenderness or discharge  CV: above  GI: NEGATIVE for nausea, abdominal pain, heartburn, or change in bowel habits  : NEGATIVE for frequency, dysuria, or hematuria  MUSCULOSKELETAL: NEGATIVE for significant arthralgias or myalgia  NEURO: NEGATIVE for weakness, dizziness or paresthesias  ENDOCRINE: NEGATIVE for temperature intolerance, skin/hair changes  HEME: NEGATIVE for bleeding problems  PSYCHIATRIC: anxious  Physical Exam:  Vitals Were Reviewed    Patient Vitals for the past 16 hrs:   BP Temp Temp src Pulse Heart Rate Resp SpO2   02/24/19 0120 128/79 98  F (36.7  C) Oral 75 -- 20 97 %   02/24/19 0000 -- -- -- 76 70 -- --   02/23/19 1951 119/88 97.7  F (36.5  C) Oral 77 -- -- 95 %   02/23/19 1800 (!) 133/105 -- -- -- -- 24 97 %   02/23/19 1512 -- -- -- -- 65 -- --   02/23/19 1500 123/79 98.2  F (36.8  C) Oral 68 -- 22 95 %         Intake/Output Summary (Last 24 hours) at 2/24/2019 0626  Last data filed at 2/23/2019 2000  Gross per 24 hour   Intake 1092.9 ml   Output 750 ml   Net 342.9 ml       GENERAL APPEARANCE: anxious  EYES: conjunctiva clear, eyes grossly normal  RESP: I don't here wheezing, with force exhalation I here some noise that seems to be generated in the upper airway  CV: regular rate and rhythm, normal S1 S2, no S3 or S4 and no murmur, click or rub   ABDOMEN: soft, nontender, no HSM or masses and bowel sounds normal  MS: no clubbing, cyanosis; no edema  SKIN: clear without  significant rashes or lesions    Lab:  Recent Labs   Lab Test 02/24/19  0523 02/22/19  2136    139   POTASSIUM 4.1 3.8   CHLORIDE 107 105   CO2 28 29   ANIONGAP 5 5   * 160*   BUN 19 22   CR 1.04 1.26*   ISA 8.4* 8.8     CBC RESULTS:   Recent Labs   Lab Test 02/24/19  0523 02/22/19  2136   WBC 6.5 8.9   RBC 4.94 5.32   HGB 15.4 16.4   HCT 46.1 49.4    201       Results for orders placed or performed during the hospital encounter of 02/22/19 (from the past 24 hour(s))   Glucose by meter   Result Value Ref Range    Glucose 170 (H) 70 - 99 mg/dL   Troponin I   Result Value Ref Range    Troponin I ES <0.015 0.000 - 0.045 ug/L   Glucose by meter   Result Value Ref Range    Glucose 136 (H) 70 - 99 mg/dL   XR Chest Port 1 View    Narrative    XR CHEST PORT 1 VW  2/23/2019 7:26 PM     HISTORY:  cough sob    COMPARISON: None.    FINDINGS:  Heart and pulmonary vasculature are normal. The lungs are  clear.      Impression    IMPRESSION: No active alveolar-type infiltrates.    GERMÁN DIAS MD   Glucose by meter   Result Value Ref Range    Glucose 157 (H) 70 - 99 mg/dL   Glucose by meter   Result Value Ref Range    Glucose 141 (H) 70 - 99 mg/dL   Basic metabolic panel   Result Value Ref Range    Sodium 140 133 - 144 mmol/L    Potassium 4.1 3.4 - 5.3 mmol/L    Chloride 107 94 - 109 mmol/L    Carbon Dioxide 28 20 - 32 mmol/L    Anion Gap 5 3 - 14 mmol/L    Glucose 153 (H) 70 - 99 mg/dL    Urea Nitrogen 19 7 - 30 mg/dL    Creatinine 1.04 0.66 - 1.25 mg/dL    GFR Estimate 83 >60 mL/min/[1.73_m2]    GFR Estimate If Black >90 >60 mL/min/[1.73_m2]    Calcium 8.4 (L) 8.5 - 10.1 mg/dL   CBC with platelets   Result Value Ref Range    WBC 6.5 4.0 - 11.0 10e9/L    RBC Count 4.94 4.4 - 5.9 10e12/L    Hemoglobin 15.4 13.3 - 17.7 g/dL    Hematocrit 46.1 40.0 - 53.0 %    MCV 93 78 - 100 fl    MCH 31.2 26.5 - 33.0 pg    MCHC 33.4 31.5 - 36.5 g/dL    RDW 13.2 10.0 - 15.0 %    Platelet Count 154 150 - 450 10e9/L   Troponin I    Result Value Ref Range    Troponin I ES <0.015 0.000 - 0.045 ug/L       Assessment and Plan:    Chest pain/shortness of breath in a patient with multiple cardiac risk factors.  The chest pain has been relieved with nitroglycerin.  Differential diagnosis includes ischemic coronary disease, cocaine chest pain syndrome, chest pain related to polysubstance drug abuse and withdrawal syndrome, anxiety and gastrointestinal related chest pain    Currently pt has neg troponins, normal ekg's, chest CT and CXR.  Pt will stay for a stress test and echo tomorrow. The etiology of the symptoms could be related to polysubstance use/withdrawal.  Nurses report possible wheezing-but I hear only some upper airway that seemed more like vocal cord dysfunction.      History of hypertension      History of thoracic aneurysm -- not documented on our CT.        Elevated  glucose in a patient with a history of glucose intolerance--apparent new diagnosis of diabetes mellitus type 2  HgbA1c is 7.1--will monitor blood sugars and refer to primary care post hospital. Insulin order set in place.    Obesity    Polysubstance drug abuse.   CIWA in place        Sleep apnea -- not compliant with CPAP use.        Elevated ALT -- in part related to alcohol use.       Chronic kidney disease, baseline creatinine has been between 1.2 and 1.8.       current creat 1.04    Prophylaxis-ambulate  FEN-no iv fluids    PLAN:    The differential for the chest pain/sob is broad and includes ischemic disease, chest pain related to cocaine use, chest pain related to polysubstance use withdrawal, anxiety related chest pain and GI related chest pain.  He does have risk factors and the pain has been possibly nitroglycerin sensitive so I think he will probably need to stay here until he can have a stress test tomorrow. I will also order an echo.I will also order exercise oximetry. He doesn't drop his sat while going to the bathroom though.    Given the neg studies and  trops there is a high probability that the symptoms are non cardiac.I think there is some chance that symptoms would resolve with further time off drugs.    He will need more out pt eval of the new dx of diabetes mellitus type 2.

## 2019-02-24 NOTE — PROGRESS NOTES
Pt suddenly called with difficulty catching his breath, chest pressure and really bad anxiety. Expiratory wheezes throughout O2 sats 96 on RA. Pt has a tight congested cough which is new, wheezing in lungs are also new. Dr. Jag molina, nebs requested. Pt currently getting a neb. Treated with ativan per CIWA=10. Will re-assess.

## 2019-02-24 NOTE — PROGRESS NOTES
Pt is doing well this morning, still anxious but improving. Headache is gone. Pt showered and ambulated in halls with RT. Insulin coverage for breakfast. No chest pain.

## 2019-02-24 NOTE — PLAN OF CARE
A&Ox4, VSS on RA. LS dim but clear, exp wheezing at times, [place] but sats remain in high 90s with activity. Continues to have mild, intermittent, non-radiating midsternal chest pressure-states this is much improved from before. Remains anxious, but CIWAs minimal overnight-no ativan given.Plan is for stress test prior to discharge

## 2019-02-24 NOTE — PROGRESS NOTES
Pt has been assessed for Home Oxygen needs    * RA at rest Pulse oximetry SpO2 95%  *RA during activity/exercise SpO2 95 %  Pt does not currently qualify for home O2.

## 2019-02-25 ENCOUNTER — APPOINTMENT (OUTPATIENT)
Dept: NUCLEAR MEDICINE | Facility: CLINIC | Age: 54
End: 2019-02-25
Attending: FAMILY MEDICINE
Payer: COMMERCIAL

## 2019-02-25 ENCOUNTER — APPOINTMENT (OUTPATIENT)
Dept: CARDIOLOGY | Facility: CLINIC | Age: 54
End: 2019-02-25
Attending: FAMILY MEDICINE
Payer: COMMERCIAL

## 2019-02-25 LAB
ANION GAP SERPL CALCULATED.3IONS-SCNC: 6 MMOL/L (ref 3–14)
BUN SERPL-MCNC: 16 MG/DL (ref 7–30)
CALCIUM SERPL-MCNC: 9 MG/DL (ref 8.5–10.1)
CHLORIDE SERPL-SCNC: 106 MMOL/L (ref 94–109)
CO2 SERPL-SCNC: 27 MMOL/L (ref 20–32)
CREAT SERPL-MCNC: 1.15 MG/DL (ref 0.66–1.25)
ERYTHROCYTE [DISTWIDTH] IN BLOOD BY AUTOMATED COUNT: 13.1 % (ref 10–15)
GFR SERPL CREATININE-BSD FRML MDRD: 73 ML/MIN/{1.73_M2}
GLUCOSE BLDC GLUCOMTR-MCNC: 106 MG/DL (ref 70–99)
GLUCOSE BLDC GLUCOMTR-MCNC: 131 MG/DL (ref 70–99)
GLUCOSE BLDC GLUCOMTR-MCNC: 139 MG/DL (ref 70–99)
GLUCOSE BLDC GLUCOMTR-MCNC: 151 MG/DL (ref 70–99)
GLUCOSE SERPL-MCNC: 145 MG/DL (ref 70–99)
HCT VFR BLD AUTO: 49.3 % (ref 40–53)
HGB BLD-MCNC: 16.2 G/DL (ref 13.3–17.7)
MCH RBC QN AUTO: 30.7 PG (ref 26.5–33)
MCHC RBC AUTO-ENTMCNC: 32.9 G/DL (ref 31.5–36.5)
MCV RBC AUTO: 93 FL (ref 78–100)
PLATELET # BLD AUTO: 176 10E9/L (ref 150–450)
POTASSIUM SERPL-SCNC: 4.1 MMOL/L (ref 3.4–5.3)
RBC # BLD AUTO: 5.28 10E12/L (ref 4.4–5.9)
SODIUM SERPL-SCNC: 139 MMOL/L (ref 133–144)
TROPONIN I SERPL-MCNC: <0.015 UG/L (ref 0–0.04)
WBC # BLD AUTO: 6.4 10E9/L (ref 4–11)

## 2019-02-25 PROCEDURE — 40000275 ZZH STATISTIC RCP TIME EA 10 MIN

## 2019-02-25 PROCEDURE — 93018 CV STRESS TEST I&R ONLY: CPT | Performed by: INTERNAL MEDICINE

## 2019-02-25 PROCEDURE — 99225 ZZC SUBSEQUENT OBSERVATION CARE,LEVEL II: CPT | Performed by: INTERNAL MEDICINE

## 2019-02-25 PROCEDURE — 93017 CV STRESS TEST TRACING ONLY: CPT

## 2019-02-25 PROCEDURE — 78452 HT MUSCLE IMAGE SPECT MULT: CPT

## 2019-02-25 PROCEDURE — 93306 TTE W/DOPPLER COMPLETE: CPT | Mod: 26 | Performed by: INTERNAL MEDICINE

## 2019-02-25 PROCEDURE — 25000125 ZZHC RX 250: Performed by: INTERNAL MEDICINE

## 2019-02-25 PROCEDURE — 99207 ZZC CDG-CODE CATEGORY CHANGED: CPT | Performed by: INTERNAL MEDICINE

## 2019-02-25 PROCEDURE — 25000132 ZZH RX MED GY IP 250 OP 250 PS 637: Performed by: INTERNAL MEDICINE

## 2019-02-25 PROCEDURE — 80048 BASIC METABOLIC PNL TOTAL CA: CPT | Performed by: FAMILY MEDICINE

## 2019-02-25 PROCEDURE — 78452 HT MUSCLE IMAGE SPECT MULT: CPT | Mod: 26 | Performed by: INTERNAL MEDICINE

## 2019-02-25 PROCEDURE — 40000264 ECHOCARDIOGRAM COMPLETE

## 2019-02-25 PROCEDURE — 85027 COMPLETE CBC AUTOMATED: CPT | Performed by: FAMILY MEDICINE

## 2019-02-25 PROCEDURE — 25500064 ZZH RX 255 OP 636: Performed by: INTERNAL MEDICINE

## 2019-02-25 PROCEDURE — 84484 ASSAY OF TROPONIN QUANT: CPT | Performed by: FAMILY MEDICINE

## 2019-02-25 PROCEDURE — 93016 CV STRESS TEST SUPVJ ONLY: CPT | Performed by: FAMILY MEDICINE

## 2019-02-25 PROCEDURE — 00000146 ZZHCL STATISTIC GLUCOSE BY METER IP

## 2019-02-25 PROCEDURE — 34300033 ZZH RX 343: Performed by: INTERNAL MEDICINE

## 2019-02-25 PROCEDURE — 36415 COLL VENOUS BLD VENIPUNCTURE: CPT | Performed by: FAMILY MEDICINE

## 2019-02-25 PROCEDURE — A9502 TC99M TETROFOSMIN: HCPCS | Performed by: INTERNAL MEDICINE

## 2019-02-25 PROCEDURE — G0378 HOSPITAL OBSERVATION PER HR: HCPCS

## 2019-02-25 PROCEDURE — 25000132 ZZH RX MED GY IP 250 OP 250 PS 637: Performed by: FAMILY MEDICINE

## 2019-02-25 RX ORDER — PANTOPRAZOLE SODIUM 40 MG/1
40 TABLET, DELAYED RELEASE ORAL DAILY
Status: DISCONTINUED | OUTPATIENT
Start: 2019-02-25 | End: 2019-02-26 | Stop reason: HOSPADM

## 2019-02-25 RX ADMIN — Medication 1 MG: at 20:27

## 2019-02-25 RX ADMIN — PANTOPRAZOLE SODIUM 40 MG: 40 TABLET, DELAYED RELEASE ORAL at 13:11

## 2019-02-25 RX ADMIN — RANITIDINE 150 MG: 150 TABLET ORAL at 20:23

## 2019-02-25 RX ADMIN — ACETAMINOPHEN 650 MG: 325 TABLET, FILM COATED ORAL at 20:27

## 2019-02-25 RX ADMIN — FOLIC ACID 1 MG: 1 TABLET ORAL at 08:47

## 2019-02-25 RX ADMIN — TRIAMTERENE AND HYDROCHLOROTHIAZIDE 1 TABLET: 37.5; 25 TABLET ORAL at 08:52

## 2019-02-25 RX ADMIN — RANITIDINE 150 MG: 150 TABLET ORAL at 13:11

## 2019-02-25 RX ADMIN — HUMAN ALBUMIN MICROSPHERES AND PERFLUTREN 2 ML: 10; .22 INJECTION, SOLUTION INTRAVENOUS at 09:46

## 2019-02-25 RX ADMIN — Medication 31.4 MCI.: at 12:30

## 2019-02-25 RX ADMIN — MULTIPLE VITAMINS W/ MINERALS TAB 1 TABLET: TAB at 08:47

## 2019-02-25 RX ADMIN — Medication 100 MG: at 08:48

## 2019-02-25 RX ADMIN — LISINOPRIL 40 MG: 40 TABLET ORAL at 08:48

## 2019-02-25 RX ADMIN — IPRATROPIUM BROMIDE AND ALBUTEROL SULFATE 3 ML: .5; 3 SOLUTION RESPIRATORY (INHALATION) at 15:39

## 2019-02-25 NOTE — PROGRESS NOTES
Patient reports of having wheezing again. Nurse is hearing wheezing stethoscope. earlier lungs where clear. Requested neb, had one yesterday when this happened and states it helped.

## 2019-02-25 NOTE — PROGRESS NOTES
Patient now resting comfortably sleeping after possible severe anxiety episode. Patient received lorazepam 2 mg po and IV compazine after describing a severe headache with pain in jaw, forehead, chest.

## 2019-02-25 NOTE — PROGRESS NOTES
Denies chest pain. CIWA=0. Awaiting stress test. Aware stress test will be conducted over 2 days based on weight restrictions. Uses call light to notify staff with questions.

## 2019-02-25 NOTE — PLAN OF CARE
No chest pain reported. Continues to deny nausea, HA, anxiety. CIWA scores 0. Sleeping well between cares. /77 (BP Location: Right arm)   Pulse 87   Temp 96.6  F (35.9  C) (Oral)   Resp 18   Ht 1.829 m (6')   Wt (!) 151.2 kg (333 lb 5.4 oz)   SpO2 94%   BMI 45.21 kg/m

## 2019-02-25 NOTE — PROGRESS NOTES
Skin affirmation note    Admitting nurse completed full skin assessment, Flo score and Flo interventions. This writer agrees with the initial skin assessment findings.

## 2019-02-25 NOTE — PROGRESS NOTES
"[facility] Medicine Progress Note  Date of Service: 02/25/2019    Assessment & Plan   Mikey Sanders is a 53 year old male who presented on 2/22/2019 with chest pressure and shortness of breath starting 8 hours after admission to LTAC, [facility].     Chest pain/shortness of breath     Patient with multiple cardiac risk factors.  The chest pain has been relieved with nitroglycerin.  Differential diagnosis includes ischemic coronary disease, cocaine chest pain syndrome, chest pain related to withdrawal syndrome, anxiety. Thus far, neg troponins, normal ekg's, normal chest CT and CXR.    Continues to have occasional chest heaviness and shortness of breath at rest, not exertional. Nursing reported wheezes but upper airway breath sounds on MD exam on admission consistent with vocal cord dysfunction. Echo normal today 2/25. Treadmill stress today reportedly with dyspnea on exertion that was more than expected but no chest pain. Due to his size, the resting images will be obtained tomorrow.    - continue to treat shortness of breath with nebs as needed   - if develops persistent symptoms, recheck ECG and troponin I series.     Hypertension    Some mild increased BP.    - no change today, continue lisinopril and hydrochlorothiazide/triamterene      History of thoracic aneurysm     Mild aortic root dilatation and moderate ascending aorta dilatation on echo this admission.    - continue outpatient follow up       Diabetes mellitus type 2    Previously told mild elevation that was being watched. HgbA1c is 7.1 which is consistent with diabetes mellitus. BG have needed very little insulin this admission.    - outpatient follow up with PCP    Obesity    Patient intends to try weight loss as part of his recovery from substance abuse.     Polysubstance drug abuse    Cocaine and alcohol predominantly. Admits to being \"sneaky\" about it and few people around him were aware. He says it was his choice " to pursue treatment. He is motivated to return to [facility] on discharge from hospital.     No evidence of significant withdrawal.        Sleep apnea -- not compliant with CPAP use.        Elevated ALT -- in part related to alcohol use.       Chronic kidney disease, baseline creatinine has been between 1.2 and 1.8.       current creat 1.04    Prophylaxis-ambulate  FEN-no iv fluids    Discussion: Stress test to take 2 days.    Disposition: Anticipate discharge tomorrow if stress test negative. Will return to [facility].      Attestation:  Total time: 35 minutes    Shukri Richards MD  [facility] Medicine        Interval History   HPI reviewed. Has occasional episodes where his chest is tight and has a hard time exhaling, feels shortness of breath. These occur with rest. Had symptoms last night for a few minutes. No cough. No nausea, diaphoresis. Also c/o increased dyspnea on exertion that is consistent with activity and has been gradual in onset over past year.     No hallucinations. Occasionally feeling anxious, especially with the dyspnea events.     Wants to return to treatment.     Physical Exam   Temp:  [96.6  F (35.9  C)-98.2  F (36.8  C)] 98.2  F (36.8  C)  Pulse:  [71-98] 83  Heart Rate:  [71-98] 98  Resp:  [16-20] 18  BP: (120-157)/(66-91) 157/91  SpO2:  [92 %-96 %] 96 %    Weights:   Vitals:    02/22/19 2209 02/23/19 0115   Weight: 136.1 kg (300 lb) (!) 151.2 kg (333 lb 5.4 oz)    Body mass index is 45.21 kg/m .    Constitutional: alert and oriented, NAD, nontoxic  CV: Regular, trace bilateral lower extremity edema, no murmur  Respiratory: CTA bilaterally, mild increased resp effort after walk in padilla but not in distress  GI: Soft, obese, non-tender, bowel sounds present  Skin: Warm and dry    Data   Recent Labs   Lab 02/25/19  0544 02/24/19  0523 02/23/19  1200  02/22/19  2136   WBC 6.4 6.5  --   --  8.9   HGB 16.2 15.4  --   --  16.4   MCV 93 93  --   --  93    154  --   --  201    140  --   --  139    POTASSIUM 4.1 4.1  --   --  3.8   CHLORIDE 106 107  --   --  105   CO2 27 28  --   --  29   BUN 16 19  --   --  22   CR 1.15 1.04  --   --  1.26*   ANIONGAP 6 5  --   --  5   ISA 9.0 8.4*  --   --  8.8   * 153*  --   --  160*   ALBUMIN  --   --   --   --  3.7   PROTTOTAL  --   --   --   --  7.3   BILITOTAL  --   --   --   --  0.3   ALKPHOS  --   --   --   --  88   ALT  --   --   --   --  78*   AST  --   --   --   --  39   LIPASE  --   --   --   --  175   TROPI <0.015 <0.015 <0.015   < > <0.015    < > = values in this interval not displayed.       Recent Labs   Lab 02/25/19  1639 02/25/19  0658 02/25/19  0544 02/25/19  0154 02/24/19  2148 02/24/19  1745 02/24/19  1138 02/24/19  0523  02/22/19  2136   GLC  --   --  145*  --   --   --   --  153*  --  160*   * 131*  --  151* 174* 120* 132*  --    < >  --     < > = values in this interval not displayed.        Unresulted Labs Ordered in the Past 30 Days of this Admission     No orders found from 12/24/2018 to 2/23/2019.           Imaging: No results found for this or any previous visit (from the past 24 hour(s)).     I reviewed all new labs and imaging results over the last 24 hours. I personally reviewed no images or EKG's today.    Medications       folic acid  1 mg Oral Daily     insulin aspart  1-7 Units Subcutaneous TID AC     insulin aspart  1-5 Units Subcutaneous At Bedtime     lisinopril  40 mg Oral Daily     multivitamin w/minerals  1 tablet Oral Daily     pantoprazole  40 mg Oral Daily     ranitidine  150 mg Oral BID     triamterene-HCTZ  1 tablet Oral QAM     vitamin B1  100 mg Oral Daily   Reviewed meds    Shukri Richards MD  [facility] Medicine

## 2019-02-25 NOTE — PROGRESS NOTES
Exercise portion of the NM stress test performed today. The pt was able to exercise 8 minutes 20 seconds on the rodrick protocol with c/o severe dyspnea as a limiting factor. No chest pain. Dyspnea resolved at 2 minutes recovery. No ischemia EKG changes noted.     Pt will have a resting portion of the test done tomorrow 2/26 approx 11 am.

## 2019-02-25 NOTE — PLAN OF CARE
Patient had an episode requiring physician intervention possibly a anxiety issue. Patient feeling better now. Patient awaiting stress echo in AM.

## 2019-02-25 NOTE — PROGRESS NOTES
Pt assessment requested by RN due to pt needing prn neb. While talking with patient, he was observed to have a WOB at 5/10 (where he couldn't finish sentences without taking a breath, RR at 24). Some late, light expiratory wheezing were heard over the RML, RLL, and LLL. No wheezing was heard over areas of the neck. Pt reports regular amount of phlegm. 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 Also, he does get SOB with exertion (such as climbing stairs). He states that his oxygen is always at least 95% at check-ups, but this wheezing appears to inconsistent/intermittent. He reports getting PFT's done. However, staff is unable to see or find these records in Care Everywhere. RT assured patient that he would be checked on again to see how his breathing has improved.

## 2019-02-25 NOTE — PROGRESS NOTES
[facility] TRANSPORT NOTE  Data:   Reason for Transport:  Moved to [facility] from ICU    Joon Sickler was transported to 2306 via wheel chair at 2130.  Patient was accompanied by Nursing Assistant. Equipment used for transport: Cardiac monitor . Family was aware of reason for transport: yes    Action:  Report: received from Mindi YEN     Response:  Patient's condition upon transfer was Stable.    Dual RN skin assessment completed with Melvina Sinha

## 2019-02-25 NOTE — PLAN OF CARE
Pt alert and oriented. Denies any chest pain, no headache or nausea. Denies any SOB at this time. CIWA score 0, pt hoping for discharge after ECHO and stress test tomorrow. Resting comfortably in bed.

## 2019-02-26 ENCOUNTER — APPOINTMENT (OUTPATIENT)
Dept: NUCLEAR MEDICINE | Facility: CLINIC | Age: 54
End: 2019-02-26
Attending: FAMILY MEDICINE
Payer: COMMERCIAL

## 2019-02-26 VITALS
HEART RATE: 82 BPM | HEIGHT: 72 IN | OXYGEN SATURATION: 96 % | DIASTOLIC BLOOD PRESSURE: 90 MMHG | TEMPERATURE: 97.6 F | BODY MASS INDEX: 42.66 KG/M2 | RESPIRATION RATE: 18 BRPM | SYSTOLIC BLOOD PRESSURE: 136 MMHG | WEIGHT: 315 LBS

## 2019-02-26 PROBLEM — J60: Status: ACTIVE | Noted: 2019-02-26

## 2019-02-26 PROBLEM — E11.9 TYPE 2 DIABETES MELLITUS (H): Status: ACTIVE | Noted: 2019-02-26

## 2019-02-26 PROBLEM — K29.70 GASTRITIS: Status: ACTIVE | Noted: 2017-01-20

## 2019-02-26 PROBLEM — K20.90 ESOPHAGITIS: Status: ACTIVE | Noted: 2017-01-20

## 2019-02-26 PROBLEM — K20.90 ESOPHAGITIS: Status: RESOLVED | Noted: 2017-01-20 | Resolved: 2019-02-26

## 2019-02-26 PROBLEM — F19.10 DRUG ABUSE (H): Status: ACTIVE | Noted: 2019-02-26

## 2019-02-26 PROBLEM — K29.70 GASTRITIS: Status: RESOLVED | Noted: 2017-01-20 | Resolved: 2019-02-26

## 2019-02-26 PROBLEM — E66.01 MORBID OBESITY (H): Chronic | Status: ACTIVE | Noted: 2019-02-26

## 2019-02-26 LAB
ANION GAP SERPL CALCULATED.3IONS-SCNC: 4 MMOL/L (ref 3–14)
BUN SERPL-MCNC: 19 MG/DL (ref 7–30)
CALCIUM SERPL-MCNC: 9 MG/DL (ref 8.5–10.1)
CHLORIDE SERPL-SCNC: 103 MMOL/L (ref 94–109)
CO2 SERPL-SCNC: 32 MMOL/L (ref 20–32)
CREAT SERPL-MCNC: 1.28 MG/DL (ref 0.66–1.25)
ERYTHROCYTE [DISTWIDTH] IN BLOOD BY AUTOMATED COUNT: 13.1 % (ref 10–15)
GFR SERPL CREATININE-BSD FRML MDRD: 64 ML/MIN/{1.73_M2}
GLUCOSE BLDC GLUCOMTR-MCNC: 110 MG/DL (ref 70–99)
GLUCOSE BLDC GLUCOMTR-MCNC: 149 MG/DL (ref 70–99)
GLUCOSE SERPL-MCNC: 142 MG/DL (ref 70–99)
HCT VFR BLD AUTO: 50.3 % (ref 40–53)
HGB BLD-MCNC: 16.3 G/DL (ref 13.3–17.7)
MCH RBC QN AUTO: 30.4 PG (ref 26.5–33)
MCHC RBC AUTO-ENTMCNC: 32.4 G/DL (ref 31.5–36.5)
MCV RBC AUTO: 94 FL (ref 78–100)
PLATELET # BLD AUTO: 183 10E9/L (ref 150–450)
POTASSIUM SERPL-SCNC: 4 MMOL/L (ref 3.4–5.3)
RBC # BLD AUTO: 5.37 10E12/L (ref 4.4–5.9)
SODIUM SERPL-SCNC: 139 MMOL/L (ref 133–144)
WBC # BLD AUTO: 6.7 10E9/L (ref 4–11)

## 2019-02-26 PROCEDURE — A9502 TC99M TETROFOSMIN: HCPCS | Performed by: INTERNAL MEDICINE

## 2019-02-26 PROCEDURE — 99207 ZZC CDG-CODE CATEGORY CHANGED: CPT | Performed by: INTERNAL MEDICINE

## 2019-02-26 PROCEDURE — 34300033 ZZH RX 343: Performed by: INTERNAL MEDICINE

## 2019-02-26 PROCEDURE — 80048 BASIC METABOLIC PNL TOTAL CA: CPT | Performed by: FAMILY MEDICINE

## 2019-02-26 PROCEDURE — 25000132 ZZH RX MED GY IP 250 OP 250 PS 637: Performed by: FAMILY MEDICINE

## 2019-02-26 PROCEDURE — 36415 COLL VENOUS BLD VENIPUNCTURE: CPT | Performed by: FAMILY MEDICINE

## 2019-02-26 PROCEDURE — 85027 COMPLETE CBC AUTOMATED: CPT | Performed by: FAMILY MEDICINE

## 2019-02-26 PROCEDURE — G0378 HOSPITAL OBSERVATION PER HR: HCPCS

## 2019-02-26 PROCEDURE — 25000132 ZZH RX MED GY IP 250 OP 250 PS 637: Performed by: INTERNAL MEDICINE

## 2019-02-26 PROCEDURE — 00000146 ZZHCL STATISTIC GLUCOSE BY METER IP

## 2019-02-26 PROCEDURE — 99217 ZZC OBSERVATION CARE DISCHARGE: CPT | Performed by: INTERNAL MEDICINE

## 2019-02-26 PROCEDURE — 40000275 ZZH STATISTIC RCP TIME EA 10 MIN

## 2019-02-26 RX ORDER — MULTIPLE VITAMINS W/ MINERALS TAB 9MG-400MCG
1 TAB ORAL DAILY
Qty: 30 EACH | DISCHARGE
Start: 2019-02-27 | End: 2023-11-21

## 2019-02-26 RX ADMIN — RANITIDINE 150 MG: 150 TABLET ORAL at 08:33

## 2019-02-26 RX ADMIN — MULTIPLE VITAMINS W/ MINERALS TAB 1 TABLET: TAB at 08:33

## 2019-02-26 RX ADMIN — TRIAMTERENE AND HYDROCHLOROTHIAZIDE 1 TABLET: 37.5; 25 TABLET ORAL at 08:33

## 2019-02-26 RX ADMIN — LISINOPRIL 40 MG: 40 TABLET ORAL at 08:33

## 2019-02-26 RX ADMIN — Medication 100 MG: at 08:33

## 2019-02-26 RX ADMIN — FOLIC ACID 1 MG: 1 TABLET ORAL at 08:32

## 2019-02-26 RX ADMIN — Medication 31.8 MCI.: at 11:15

## 2019-02-26 RX ADMIN — PANTOPRAZOLE SODIUM 40 MG: 40 TABLET, DELAYED RELEASE ORAL at 08:33

## 2019-02-26 NOTE — PROGRESS NOTES
[facility] DISCHARGE NOTE    Patient discharged to [facility] at 3:16 PM via ambulation. Accompanied by other:[facility]  and staff. Discharge instructions reviewed with nurse at [facility], changes reviewed with patient regarding medications and to monitor symptoms.  No follow needed as thoroughly explained by discharging MD, unless symptoms reoccur.  , opportunity offered to ask questions. Prescriptions - None ordered for discharge. All belongings sent with patient.    Agatha Mullen

## 2019-02-26 NOTE — PLAN OF CARE
Patient alert, oriented, independent. Having stress test this am. Denies chest pain, nausea, SOB. Pt rested comfortably overnight. Denies headache. CIWA score 0 overnight. /85   Pulse 83   Temp 97.6  F (36.4  C) (Oral)   Resp 18   Ht 1.829 m (6')   Wt (!) 151.2 kg (333 lb 5.4 oz)   SpO2 94%   BMI 45.21 kg/m

## 2019-02-26 NOTE — PLAN OF CARE
Independent with mobility, no signs of alcohol withdrawal noted per ordered CIWAA scale.  Hoping to go back to [facility] today.  Walking halls, denies pain or chest pain.

## 2019-02-26 NOTE — DISCHARGE SUMMARY
Discharge Summary    Mikey Sanders MRN# [id number]   YOB: 1965 Age: 53 year old     Date of Admission:  2/22/2019  Date of Discharge:  2/26/2019  Admitting Physician:  Derick Boogie MD  Discharge Physician:  Isaiah Brumfield MD  Discharging Service:  Hospitalist     Primary Provider: No Ref-Primary, Physician              Discharge Diagnosis:     Principal Problem:    Chest pain  Active Problems:    Obstructive sleep apnea syndrome    Miners' lung (H)    Polysubstance abuse (H)    Type 2 diabetes mellitus (H)    Morbid obesity (H)               Discharge Disposition:     Transferred back to to [facility]           Condition on Discharge:     Discharge condition:   Good   Discharge vitals: Blood pressure 136/90, pulse 82, temperature 97.6  F (36.4  C), temperature source Oral, resp. rate 18, height 1.829 m (6'), weight (!) 151.2 kg (333 lb 5.4 oz), SpO2 96 %.     Code status on discharge: Full Code           Procedures / Labs / Imaging:     Results for orders placed or performed during the hospital encounter of 02/22/19   CT Aortic Survey w Contrast    Narrative    CT AORTIC SURVEY W CONTRAST 2/22/2019 11:53 PM    HISTORY: History of aortic aneurysm. Severe anterior chest pain.    COMPARISON: None.    TECHNIQUE:  Chest, abdomen and pelvis CT was performed following  intravenous administration of 100 mL Isovue -370.  Radiation dose for  this scan was reduced using automated exposure control, adjustment of  the mA and/or kV according to patient size, or iterative  reconstruction technique.    FINDINGS:     CHEST: No evidence of aortic dissection. No evidence of segmental or  subsegmental pulmonary embolus. Normal heart size. No pericardial  effusion. No adenopathy. No airspace consolidation, pleural effusion  or pneumothorax.    ABDOMEN: No evidence of aortic dissection. Liver, gallbladder, spleen,  pancreas, left adrenal gland and kidneys appear normal. Small right  adrenal nodule probably  represents benign adenoma.    PELVIS: Caliber of the iliac vasculature is normal. Bowel has normal  caliber. There are multiple prominent iliac and inguinal lymph nodes;  for reference a right iliac node measures 2.9 cm in short axis (image  195, series 5).      Impression    IMPRESSION:   1. No evidence of aortic dissection.  2. No evidence of pulmonary embolus.  3. No acute abnormality in the abdomen/pelvis.  4. Enlarged iliac and inguinal lymph nodes of uncertain clinical  significance.    TARA PUGA MD       XR Chest Port 1 View    Narrative    XR CHEST PORT 1 VW  2/23/2019 7:26 PM     HISTORY:  cough sob    COMPARISON: None.    FINDINGS:  Heart and pulmonary vasculature are normal. The lungs are  clear.      Impression    IMPRESSION: No active alveolar-type infiltrates.    GERMÁN DIAS MD       NM MPI Multi Rest Stress    Narrative    GATED  MYOCARDIAL PERFUSION SCINTIGRAPHY EXERCISE- TWO DAY STUDY     2/26/2019 12:04 PM  GALINA TORRE SICKLER  53 years  Male  1965.  BMI 45.    Exams Performed on: Rest February 26, 2018 and Stress February 25, 2019    Indication/Clinical History: 53-year-old male with no cardiac history  undergoing stress test for chest pain    Impression       1. Exercise: Average exercise capacity, target heart rate  achieved       2. EKG: Negative for ischemia       3. Symptoms: Dyspnea but no chest pain during stress  4. Myocardial perfusion imaging using single isotope technique  demonstrated abnormal perfusion. There is a moderate sized, mild  intensity fixed defect of the basal to mid inferior wall, this is most  likely inferior diaphragm attenuation. No convincing ischemia or  infarct.   5. Gated images demonstrated normal wall motion.  The left ventricular  systolic function is 55% at rest, 59% with stress.  6. No previous nuclear stress for comparison.    Procedure  The patient performed treadmill exercise using a Guido protocol,  completing 8 minutes and 20 seconds with an  estimated workload of 10.1  METS.  The test was terminated due to dyspnea. The heart rate was 73  beats per minute at baseline and increased to 148 beats at peak  exercise, which was 88% of the maximum predicted heart rate. The rest  blood pressure was 144/90 mm/Hg and peak blood pressure is 160/90mm/Hg  with rate pressure product of 23,600. The patient experienced dyspnea  during the test. The patient was not on a beta blocker.    Myocardial perfusion imaging was performed at rest, approximately 45  minutes after the injection intravenously of 31.8of Tc-99m Myoview. At  peak exercise, the patient was injected intravenously with 31.4 mCi of   Tc-99m Myoview and exercise continued for approximately 1 minute.  Gated post-stress tomographic imaging was performed approximately 30  minutes after stress    EKG Findings  The resting EKG demonstrated sinus rhythm, no baseline ST segment  abnormalities . The stress EKG demonstrated no changes from baseline.    Tomographic Findings  Overall, the study quality is good . On the stress images, moderate  sized, mild intensity basal to mid inferior defect. On the rest  images, similar to stress imaging . Gated images demonstrated no wall  motion abnormalities. The left ventricular ejection fraction was  calculated to be 55% at rest, 59% with stress. TID was 0.97, normal.      SEHA CRABTREE MD                Discharge Medications:     Current Discharge Medication List      START taking these medications    Details   multivitamin w/minerals (THERA-VIT-M) tablet Take 1 tablet by mouth daily  Qty: 30 each    Associated Diagnoses: Polysubstance abuse (H)         CONTINUE these medications which have NOT CHANGED    Details   acetaminophen (TYLENOL) 325 MG tablet Take 650 mg by mouth every 6 hours as needed for mild pain      hydrOXYzine (ATARAX) 25 MG tablet Take 25 mg by mouth 4 times daily as needed      lisinopril (PRINIVIL/ZESTRIL) 40 MG tablet Take 40 mg by mouth daily       LORazepam (ATIVAN) 1 MG tablet Take 1 mg by mouth daily      melatonin 5 MG tablet Take 5 mg by mouth nightly as needed for sleep      pantoprazole (PROTONIX) 40 MG EC tablet Take 40 mg by mouth daily      ranitidine (ZANTAC) 150 MG tablet Take 150 mg by mouth 2 times daily      triamterene-HCTZ (DYAZIDE) 37.5-25 MG capsule Take 1 capsule by mouth every morning      vitamin B1 (THIAMINE) 100 MG tablet Take 100 mg by mouth daily      albuterol (PROAIR HFA/PROVENTIL HFA/VENTOLIN HFA) 108 (90 Base) MCG/ACT inhaler Inhale 2 puffs into the lungs every 6 hours as needed for wheezing      nitroGLYcerin (NITROSTAT) 0.4 MG sublingual tablet Place 0.4 mg under the tongue every 5 minutes as needed for chest pain For chest pain place 1 tablet under the tongue every 5 minutes for 3 doses. If symptoms persist 5 minutes after 1st dose call 911.         STOP taking these medications       PHENobarbital (LUMINAL) 32.4 MG tablet Comments:   Reason for Stopping: Unclear indication, may resume at discretion of provider at [facility]                     Consultations:     No consultations were requested during this admission             Brief History of Illness:     Chest pressure and shortness of breath starting 8 hours after admission to [facility].      HISTORY OF PRESENT ILLNESS:  Mikey Sanders is a 53-year-old male with a history of hypertension, gastritis, obstructive sleep apnea with noncompliance of CPAP, obesity hypoventilation, impaired fasting glucose, ascending aortic aneurysm and previous hypertension, hypertensive emergency.  The patient has been using cocaine and alcohol heavily.  He has been through treatment once in the past 4-5 years ago in [place].  He lives in [place].  He has a strong family history of cardiac disease.  He decided that he wanted to get off chemicals.  He presented to [facility] 48 hours after admission.  He developed dyspnea and chest pressure, some occasional sweats but no nausea.  He had been  given phenobarbital.  He was brought to the emergency room.      In the emergency room, he had a normal chest x-ray.  Troponins have been normal.  His pain responded to nitroglycerin.  He was put on a nitroglycerin drip and admitted.  So far his troponins are normal.  During the night, he developed a headache.  His nitroglycerin drip was on the level, but it was turned off.  The headache resolved, but he developed more feelings of shortness of breath.  The nitroglycerin was turned back on and he thought that that helped.  He is currently symptom-free but he does have a lot of anxiety.  His tox screen was positive for opiates, barbiturates and cocaine.  He also reports heavy drinking.  At this point in time, we are turning off the nitroglycerin and going to observe the patient.  He had a CT done upon admission that showed no dissection.  There was no report of aneurysm on that, although he does report this            Hospital Course:     Patient ruled out for MI by serial enzy,me and EKG. D-dimer was normal. He had a normal echocardiogram. He remained pain free, but notes dyspnea on exertion which is increased from baseline and present for several weeks. Nuclear stress test shows fixed inferior defect which is most likely due to diaphragmatic attenuation.     A1c elevated at 7.1 consistent with new diagnosis of diabetes. This can be addressed by his PCP at follow-up.             Final Day of Progress before Discharge:       Assessment and Plan:  Stable. Discharge          Interval History:  No new complaints.           Physical Exam:  Vitals were reviewed  Constitutional:   awake, alert, cooperative, no apparent distress, and appears stated age     Lungs:   No increased work of breathing, good air exchange, clear to auscultation bilaterally, no crackles or wheezing     Cardiovascular:   Normal apical impulse, regular rate and rhythm, normal S1 and S2, no S3 or S4, and no murmur noted     Musculoskeletal:   1 + edema  at ankle          Data:  Lab Results   Component Value Date    TROPI <0.015 02/25/2019    TROPI <0.015 02/24/2019    TROPI <0.015 02/23/2019    TROPI <0.015 02/23/2019    TROPI <0.015 02/22/2019     Heart Failure Labs  Outpatient: No results found for: NTBNP    Inpatient:   Lab Results   Component Value Date    NTBNPI 6 02/22/2019     CMP  Recent Labs   Lab 02/26/19  0540 02/25/19  0544 02/24/19  0523 02/22/19  2136    139 140 139   POTASSIUM 4.0 4.1 4.1 3.8   CHLORIDE 103 106 107 105   CO2 32 27 28 29   ANIONGAP 4 6 5 5   * 145* 153* 160*   BUN 19 16 19 22   CR 1.28* 1.15 1.04 1.26*   GFRESTIMATED 64 73 83 65   GFRESTBLACK 74 85 >90 76   ISA 9.0 9.0 8.4* 8.8   PROTTOTAL  --   --   --  7.3   ALBUMIN  --   --   --  3.7   BILITOTAL  --   --   --  0.3   ALKPHOS  --   --   --  88   AST  --   --   --  39   ALT  --   --   --  78*     CBC  Recent Labs   Lab 02/26/19  0540 02/25/19  0544 02/24/19  0523 02/22/19  2136   WBC 6.7 6.4 6.5 8.9   RBC 5.37 5.28 4.94 5.32   HGB 16.3 16.2 15.4 16.4   HCT 50.3 49.3 46.1 49.4   MCV 94 93 93 93   MCH 30.4 30.7 31.2 30.8   MCHC 32.4 32.9 33.4 33.2   RDW 13.1 13.1 13.2 13.2    176 154 201                Pending Results:     Unresulted Labs Ordered in the Past 30 Days of this Admission     No orders found from 12/24/2018 to 2/23/2019.                 Discharge Instructions and Follow-Up:     Discharge diet:   Regular   Discharge activity: Activity as tolerated   Discharge follow-up: Follow up with primary care provide after [place]     I spent >30 minutes with patient.

## 2020-09-16 ENCOUNTER — RECORDS - HEALTHEAST (OUTPATIENT)
Dept: ADMINISTRATIVE | Facility: OTHER | Age: 55
End: 2020-09-16

## 2021-01-07 ENCOUNTER — COMMUNICATION - HEALTHEAST (OUTPATIENT)
Dept: SCHEDULING | Facility: CLINIC | Age: 56
End: 2021-01-07

## 2021-06-14 NOTE — TELEPHONE ENCOUNTER
Patient calling to follow up on CT from ED. Advised he make an appt with PCP for this.   No further triage or sx at this time   Bianka Mario RN  [facility]  COVID 19 Nurse Triage Plan/Patient Instructions    Please be aware that novel coronavirus (COVID-19) may be circulating in the community. If you develop symptoms such as fever, cough, or SOB or if you have concerns about the presence of another infection including coronavirus (COVID-19), please contact your health care provider or visit [email or web address].     Disposition/Instructions    In-Person Visit with provider recommended. Reference Visit Selection Guide.    Thank you for taking steps to prevent the spread of this virus.  o Limit your contact with others.  o Wear a simple mask to cover your cough.  o Wash your hands well and often.    Resources    [facility]: About COVID-19: [email or web address]    CDC: What to Do If You're Sick: www.cdc.gov/coronavirus/2019-ncov/about/steps-when-sick.html    CDC: Ending Home Isolation: www.cdc.gov/coronavirus/2019-ncov/hcp/disposition-in-home-patients.html     CDC: Caring for Someone: www.cdc.gov/coronavirus/2019-ncov/if-you-are-sick/care-for-someone.html     [facility]: Interim Guidance for Hospital Discharge to Home: www.health.[facility].mn.us/diseases/coronavirus/hcp/hospdischarge.pdf    [facility] clinical trials (COVID-19 research studies): [email or web address].[facility]-clinical-trials     Below are the COVID-19 hotlines at the [facility] of Health ([facility]). Interpreters are available.   o For health questions: Call 025-997-9668 or 1-156.712.2434 (7 a.m. to 7 p.m.)  o For questions about schools and childcare: Call 078-426-1956 or 1-578.851.2093 (7 a.m. to 7 p.m.)

## 2021-06-18 ENCOUNTER — HOSPITAL ENCOUNTER (EMERGENCY)
Dept: EMERGENCY MEDICINE | Facility: CLINIC | Age: 56
Discharge: HOME | End: 2021-06-18
Attending: EMERGENCY MEDICINE

## 2021-06-18 DIAGNOSIS — M62.08 DIASTASIS RECTI: ICD-10-CM

## 2021-06-18 DIAGNOSIS — R10.84 GENERALIZED ABDOMINAL PAIN: ICD-10-CM

## 2021-06-18 LAB
ALBUMIN SERPL-MCNC: 4.1 G/DL (ref 3.5–5)
ALP SERPL-CCNC: 80 U/L (ref 45–120)
ALT SERPL W P-5'-P-CCNC: 38 U/L (ref 0–45)
ANION GAP SERPL CALCULATED.3IONS-SCNC: 8 MMOL/L (ref 5–18)
AST SERPL W P-5'-P-CCNC: 30 U/L (ref 0–40)
BILIRUB SERPL-MCNC: 0.3 MG/DL (ref 0–1)
BUN SERPL-MCNC: 15 MG/DL (ref 8–22)
CALCIUM SERPL-MCNC: 9.8 MG/DL (ref 8.5–10.5)
CHLORIDE BLD-SCNC: 112 MMOL/L (ref 98–107)
CO2 SERPL-SCNC: 25 MMOL/L (ref 22–31)
CREAT SERPL-MCNC: 1.13 MG/DL (ref 0.7–1.3)
ERYTHROCYTE [DISTWIDTH] IN BLOOD BY AUTOMATED COUNT: 13.5 % (ref 11–14.5)
GFR SERPL CREATININE-BSD FRML MDRD: >60 ML/MIN/1.73M2
GLUCOSE BLD-MCNC: 110 MG/DL (ref 70–125)
HCT VFR BLD AUTO: 46 % (ref 40–54)
HGB BLD-MCNC: 15.4 G/DL (ref 14–18)
LACTATE SERPL-SCNC: 0.8 MMOL/L (ref 0.7–2)
LIPASE SERPL-CCNC: 18 U/L (ref 0–52)
MCH RBC QN AUTO: 30.8 PG (ref 27–34)
MCHC RBC AUTO-ENTMCNC: 33.5 G/DL (ref 32–36)
MCV RBC AUTO: 92 FL (ref 80–100)
PLATELET # BLD AUTO: 190 THOU/UL (ref 140–440)
PMV BLD AUTO: 11.4 FL (ref 8.5–12.5)
POTASSIUM BLD-SCNC: 4.5 MMOL/L (ref 3.5–5)
PROT SERPL-MCNC: 7.2 G/DL (ref 6–8)
RBC # BLD AUTO: 5 MILL/UL (ref 4.4–6.2)
SODIUM SERPL-SCNC: 145 MMOL/L (ref 136–145)
WBC: 8.9 THOU/UL (ref 4–11)

## 2021-06-18 ASSESSMENT — MIFFLIN-ST. JEOR: SCORE: 2221.11

## 2021-06-25 NOTE — ED TRIAGE NOTES
Patient is here with mid abdomen pain that started one week then he was shoveling today at noon and injured above the area of pain. He does have a known hernia there and it feel pressure with burning.

## 2021-06-26 NOTE — ED PROVIDER NOTES
"EMERGENCY DEPARTMENT ENCOUNTER      NAME: Mikey Sanders  AGE: 55 y.o. male  YOB: 1965  MRN: [id number]  EVALUATION DATE & TIME: 6/18/2021  3:20 PM    PCP: Provider, No Primary Care    ED PROVIDER: Derick Barker DO      Chief Complaint   Patient presents with     Abdominal Pain         FINAL IMPRESSION:  1. Generalized abdominal pain    2. Diastasis recti          ED COURSE & MEDICAL DECISION MAKING:    3:23 PM I met with the patient to gather history and to perform my initial exam. We discussed plans for the ED course, including diagnostic testing and treatment. PPE worn: n95 mask and goggles.  5:00 PM I rechecked and updated the patient with results. Patient requests tylenol for his current headache.   6:00 PM Patient reports he is pain free.  6:25 PM I rechecked and updated the patient with results and plan for discharge. Patient is agreeable. No evidence of hernia on CT. Pain resolved will discharge    Pertinent Labs & Imaging studies reviewed. (See chart for details)  55 y.o. male presents to the Emergency Department for evaluation of periumbilical abdominal pain.  Patient has a history of an umbilical hernia, states that he was shoveling today and felt a \"pop \"in his mid abdomen.  On examination he does have diastasis recti however no palpable hernia.  Patient is very concerned that he reherniated himself so will obtain a CT to assess for possible hernia sac.  We'll treat his pain will reevaluate.    At the conclusion of the encounter I discussed the results of all of the tests and the disposition. The questions were answered. The patient or family acknowledged understanding and was agreeable with the care plan.       0 minutes of critical care time     MEDICATIONS GIVEN IN THE EMERGENCY:  Medications   sodium chloride flush 10 mL (NS) (has no administration in time range)   acetaminophen tablet 650 mg (TYLENOL) (650 mg Oral Not Given 6/18/21 1705)   sodium chloride 0.9% 1,000 mL (0 mL " Intravenous Stopped 6/18/21 1743)   morphine injection 4 mg (4 mg Intravenous Given 6/18/21 1609)   haloperidol lactate injection 2.5 mg (HALDOL) (2.5 mg Intravenous Given 6/18/21 1609)   iopamidol solution 100 mL (ISOVUE-370) (100 mL Intravenous Given 6/18/21 1723)       NEW PRESCRIPTIONS STARTED AT TODAY'S ER VISIT  Current Discharge Medication List      CONTINUE these medications which have NOT CHANGED    Details   HYDROcodone-acetaminophen 5-325 mg per tablet Take 1 tablet by mouth every 4 (four) hours as needed for pain.  Qty: 8 tablet, Refills: 0    Associated Diagnoses: Contusion of right foot, initial encounter      oxyCODONE (ROXICODONE) 5 MG immediate release tablet Take 1 tablet (5 mg total) by mouth every 6 (six) hours as needed for pain.  Qty: 12 tablet, Refills: 0    Associated Diagnoses: Acute gouty arthritis      !! oxyCODONE-acetaminophen (PERCOCET/ENDOCET) 5-325 mg per tablet Take 1 tablet by mouth every 4 (four) hours as needed.  Qty: 4 tablet, Refills: 0    Associated Diagnoses: Generalized abdominal pain      !! oxyCODONE-acetaminophen (PERCOCET/ENDOCET) 5-325 mg per tablet Take 1 tablet by mouth every 4 (four) hours as needed.  Qty: 8 tablet, Refills: 0    Associated Diagnoses: Facial cellulitis       !! - Potential duplicate medications found. Please discuss with provider.               =================================================================    HPI    Patient information was obtained from: patient     Use of : N/A       Mikey Sanders is a 55 y.o. male with a pertinent history of s/p mesh umbilical hernia repair, s/p appendectomy, DM2, HTN, ascending aortic aneurysm, CKD3, polysubstance abuse, and morbid obesity who presents to this ED via private vehicle for evaluation of abdominal pain.    Patient reports mild periumbilical abdominal pain over the past week that notably worsened this afternoon, when he felt a tear in his mid abdomen while shoveling. Reports intense  "constant and non-radiating pain since he felt the tearing sensation. Worse with movement. States his abdomen is bulging and his pain feels similar to a prior umbilical hernia. Denies vomiting and diarrhea. No other complaints or concerns expressed at this time.      REVIEW OF SYSTEMS   Review of Systems   Gastrointestinal: Positive for abdominal pain. Negative for diarrhea and vomiting.   All other systems reviewed and are negative.       PAST MEDICAL HISTORY:  History reviewed. No pertinent past medical history.    PAST SURGICAL HISTORY:  History reviewed. No pertinent surgical history.        CURRENT MEDICATIONS:    No current facility-administered medications on file prior to encounter.      Current Outpatient Medications on File Prior to Encounter   Medication Sig     HYDROcodone-acetaminophen 5-325 mg per tablet Take 1 tablet by mouth every 4 (four) hours as needed for pain.     oxyCODONE (ROXICODONE) 5 MG immediate release tablet Take 1 tablet (5 mg total) by mouth every 6 (six) hours as needed for pain.     oxyCODONE-acetaminophen (PERCOCET/ENDOCET) 5-325 mg per tablet Take 1 tablet by mouth every 4 (four) hours as needed.     oxyCODONE-acetaminophen (PERCOCET/ENDOCET) 5-325 mg per tablet Take 1 tablet by mouth every 4 (four) hours as needed.       ALLERGIES:  Allergies   Allergen Reactions     Sucralfate Other (See Comments)     \"makes heart pound\"  \"makes heart pound\"  \"makes heart pound\"       Lansoprazole Palpitations     12/4/16 pt states not allergic  12/4/16 pt states not allergic  12/4/16 pt states not allergic  12/4/16 pt states not allergic       Nizatidine Palpitations       FAMILY HISTORY:  History reviewed. No pertinent family history.    SOCIAL HISTORY:   Social History     Socioeconomic History     Marital status:      Spouse name: None     Number of children: None     Years of education: None     Highest education level: None   Occupational History     None   Social Needs     Financial " resource strain: None     Food insecurity     Worry: None     Inability: None     Transportation needs     Medical: None     Non-medical: None   Tobacco Use     Smoking status: Never Smoker     Smokeless tobacco: Never Used   Substance and Sexual Activity     Alcohol use: Not Currently     Drug use: Not Currently     Comment: former polysubstance abuse     Sexual activity: None   Lifestyle     Physical activity     Days per week: None     Minutes per session: None     Stress: None   Relationships     Social connections     Talks on phone: None     Gets together: None     Attends [employer] service: None     Active member of club or organization: None     Attends meetings of clubs or organizations: None     Relationship status: None     Intimate partner violence     Fear of current or ex partner: None     Emotionally abused: None     Physically abused: None     Forced sexual activity: None   Other Topics Concern     None   Social History Narrative     None       VITALS:  Patient Vitals for the past 24 hrs:   BP Temp Temp src Pulse Resp SpO2 Height Weight   06/18/21 1730 (!) 167/107 -- -- 73 -- 96 % -- --   06/18/21 1715 -- -- -- 72 19 93 % -- --   06/18/21 1631 139/85 -- -- 74 26 92 % -- --   06/18/21 1601 (!) 177/97 -- -- 79 -- 95 % -- --   06/18/21 1514 (!) 185/128 98.3  F (36.8  C) Oral 84 16 96 % 6' (1.829 m) (!) 295 lb (133.8 kg)       PHYSICAL EXAM    Physical Exam   Constitutional: He is oriented to person, place, and time. He appears well-developed and well-nourished. No distress.   HENT:   Head: Normocephalic and atraumatic.   Mouth/Throat: Oropharynx is clear and moist. No oropharyngeal exudate.   Eyes: Pupils are equal, round, and reactive to light. Conjunctivae and EOM are normal.   Neck: Normal range of motion. Neck supple. No tracheal deviation present.   Cardiovascular: Normal rate, regular rhythm, normal heart sounds and intact distal pulses.   No murmur heard.  Pulmonary/Chest: Effort normal and  breath sounds normal. No stridor. No respiratory distress. He has no wheezes. He has no rales. He exhibits no tenderness.   Abdominal: Soft. Bowel sounds are normal. He exhibits no distension. There is abdominal tenderness in the periumbilical area. There is no rebound and no guarding.   Old surgical wound from umbilical hernia, diastasis recti, no palpable hernia   Musculoskeletal: Normal range of motion.         General: No tenderness, deformity or edema.   Neurological: He is alert and oriented to person, place, and time. He displays normal reflexes. No cranial nerve deficit. He exhibits normal muscle tone. Coordination normal.   Skin: Skin is warm and dry. No rash noted. He is not diaphoretic. No erythema.   Psychiatric: He has a normal mood and affect. His behavior is normal.   Nursing note and vitals reviewed.         LAB:  All pertinent labs reviewed and interpreted.  Results for orders placed or performed during the hospital encounter of 06/18/21   HM2(CBC w/o Differential)   Result Value Ref Range    WBC 8.9 4.0 - 11.0 thou/uL    RBC 5.00 4.40 - 6.20 mill/uL    Hemoglobin 15.4 14.0 - 18.0 g/dL    Hematocrit 46.0 40.0 - 54.0 %    MCV 92 80 - 100 fL    MCH 30.8 27.0 - 34.0 pg    MCHC 33.5 32.0 - 36.0 g/dL    RDW 13.5 11.0 - 14.5 %    Platelets 190 140 - 440 thou/uL    MPV 11.4 8.5 - 12.5 fL   Comprehensive Metabolic Panel   Result Value Ref Range    Sodium 145 136 - 145 mmol/L    Potassium 4.5 3.5 - 5.0 mmol/L    Chloride 112 (H) 98 - 107 mmol/L    CO2 25 22 - 31 mmol/L    Anion Gap, Calculation 8 5 - 18 mmol/L    Glucose 110 70 - 125 mg/dL    BUN 15 8 - 22 mg/dL    Creatinine 1.13 0.70 - 1.30 mg/dL    GFR MDRD Af Amer >60 >60 mL/min/1.73m2    GFR MDRD Non Af Amer >60 >60 mL/min/1.73m2    Bilirubin, Total 0.3 0.0 - 1.0 mg/dL    Calcium 9.8 8.5 - 10.5 mg/dL    Protein, Total 7.2 6.0 - 8.0 g/dL    Albumin 4.1 3.5 - 5.0 g/dL    Alkaline Phosphatase 80 45 - 120 U/L    AST 30 0 - 40 U/L    ALT 38 0 - 45 U/L    Lipase   Result Value Ref Range    Lipase 18 0 - 52 U/L   Lactic Acid   Result Value Ref Range    Lactic Acid 0.8 0.7 - 2.0 mmol/L       RADIOLOGY:  Reviewed all pertinent imaging. Please see official radiology report.  Ct Abdomen Pelvis Without Oral With Iv Contrast    Result Date: 6/18/2021  EXAM: CT ABDOMEN PELVIS WO ORAL W IV CONTRAST LOCATION: [facility] DATE/TIME: 6/18/2021 5:30 PM INDICATION: Abdominal pain, known umbilical hernia. COMPARISON: 2/25/2021 and additional exams dating back to 9/1/2019. TECHNIQUE: CT scan of the abdomen and pelvis was performed following injection of IV contrast. Multiplanar reformats were obtained. Dose reduction techniques were used. CONTRAST: 100 mL iopamidol (Isovue-370). FINDINGS: LOWER CHEST: Degraded from motion artifact, otherwise unremarkable. HEPATOBILIARY: Degraded from motion artifact. Hepatic steatosis. Gallbladder unremarkable. PANCREAS: Normal. SPLEEN: Degraded from motion artifact, otherwise unremarkable. ADRENAL GLANDS: Stable probable right adrenal gland adenoma with indeterminate Hounsfield units of 34. Left adrenal gland normal. KIDNEYS/BLADDER: Normal. BOWEL: Normal. LYMPH NODES: Stable indeterminate external iliac chain adenopathy right greater than left. VASCULATURE: Unremarkable. PELVIC ORGANS: Normal. MUSCULOSKELETAL: Paraumbilical hernia repair intact. No new hernia. Degenerative disease. Stable bone lesion L3 vertebral body.     1.  Paraumbilical hernia repair intact. 2.  Hepatic steatosis. 3.  Stable probable right adrenal gland adenoma with technically indeterminate Hounsfield units. 4.  Stable indeterminate pelvic adenopathy. 5.  Stable bone lesion L3 vertebral body.       EKG:    None    Procedures  None      I, Emir Figueroa , am serving as a scribe to document services personally performed by Dr. Barker based on my observation and the provider's statements to me. I, Derick Barker, DO attest that Emir Figueroa is acting  in a scribe capacity, has observed my performance of the services and has documented them in accordance with my direction.    Derick Barker DO  Emergency Medicine  [facility] EMERGENCY ROOM  [address] [place] [address]  Dept: [phone]  Loc: [phone]       Derick Barker DO  06/18/21 2057

## 2021-07-06 VITALS — BODY MASS INDEX: 39.96 KG/M2 | HEIGHT: 72 IN | WEIGHT: 295 LBS

## 2022-06-09 ENCOUNTER — HOSPITAL ENCOUNTER (OUTPATIENT)
Dept: ULTRASOUND IMAGING | Facility: CLINIC | Age: 57
Discharge: HOME | End: 2022-06-09
Attending: PHYSICIAN ASSISTANT | Admitting: PHYSICIAN ASSISTANT
Payer: COMMERCIAL

## 2022-06-09 DIAGNOSIS — Z47.89 AFTERCARE FOLLOWING SURGERY OF THE MUSCULOSKELETAL SYSTEM: ICD-10-CM

## 2022-06-09 DIAGNOSIS — M79.604 PAIN OF RIGHT LOWER EXTREMITY: ICD-10-CM

## 2022-06-09 PROCEDURE — 93971 EXTREMITY STUDY: CPT | Mod: RT

## 2023-01-28 ENCOUNTER — HEALTH MAINTENANCE LETTER (OUTPATIENT)
Age: 58
End: 2023-01-28

## 2023-05-06 ENCOUNTER — HEALTH MAINTENANCE LETTER (OUTPATIENT)
Age: 58
End: 2023-05-06

## 2023-10-08 ENCOUNTER — HEALTH MAINTENANCE LETTER (OUTPATIENT)
Age: 58
End: 2023-10-08

## 2023-10-13 ENCOUNTER — TRANSFERRED RECORDS (OUTPATIENT)
Dept: HEALTH INFORMATION MANAGEMENT | Facility: CLINIC | Age: 58
End: 2023-10-13
Payer: COMMERCIAL

## 2023-10-24 ENCOUNTER — TRANSCRIBE ORDERS (OUTPATIENT)
Dept: OTHER | Age: 58
End: 2023-10-24

## 2023-10-24 DIAGNOSIS — H52.203 ASTIGMATISM OF BOTH EYES, UNSPECIFIED TYPE: Primary | ICD-10-CM

## 2023-10-24 DIAGNOSIS — H52.00 HYPEROPIA, UNSPECIFIED LATERALITY: ICD-10-CM

## 2023-10-24 DIAGNOSIS — H02.831 DERMATOCHALASIS OF RIGHT UPPER EYELID: ICD-10-CM

## 2023-10-24 DIAGNOSIS — H52.4 PRESBYOPIA: ICD-10-CM

## 2023-10-27 ENCOUNTER — TELEPHONE (OUTPATIENT)
Dept: OPHTHALMOLOGY | Facility: CLINIC | Age: 58
End: 2023-10-27
Payer: COMMERCIAL

## 2023-10-27 NOTE — TELEPHONE ENCOUNTER
"LVM for patient regarding referral and scheduling with either  or  in Oculoplastics for \"dermatochalasis\" -Per Regine Smith at [facility]. Provided Eye Clinic and direct number for scheduling options.     (not urgent)  "

## 2023-10-30 NOTE — TELEPHONE ENCOUNTER
FUTURE VISIT INFORMATION      FUTURE VISIT INFORMATION:  Date: 11/21/23  Time: 7:30am  Location: [facility]  REFERRAL INFORMATION:  Referring provider:  Regine Smith OD   Referring providers clinic:  [facility]   Reason for visit/diagnosis  Dermatochalasis of right upper eyelid     RECORDS REQUESTED FROM:       Clinic name Comments Records Status Imaging Status   [facility]  Recs scanned into chart under 10/13/23 [facility]    Imaging CT Facial bones 4/9/21 [facility]

## 2023-11-17 ENCOUNTER — TELEPHONE (OUTPATIENT)
Dept: OPHTHALMOLOGY | Facility: CLINIC | Age: 58
End: 2023-11-17
Payer: COMMERCIAL

## 2023-11-17 NOTE — TELEPHONE ENCOUNTER
LVM for patient confirming appointment on 11/21/23. Provided Eye Clinic number for any scheduling conflicts.

## 2023-11-21 ENCOUNTER — OFFICE VISIT (OUTPATIENT)
Dept: OPHTHALMOLOGY | Facility: CLINIC | Age: 58
End: 2023-11-21
Payer: COMMERCIAL

## 2023-11-21 ENCOUNTER — PRE VISIT (OUTPATIENT)
Dept: OPHTHALMOLOGY | Facility: CLINIC | Age: 58
End: 2023-11-21

## 2023-11-21 ENCOUNTER — TELEPHONE (OUTPATIENT)
Dept: OPHTHALMOLOGY | Facility: CLINIC | Age: 58
End: 2023-11-21

## 2023-11-21 VITALS — HEIGHT: 72 IN | BODY MASS INDEX: 38.47 KG/M2 | WEIGHT: 284 LBS

## 2023-11-21 DIAGNOSIS — H02.831 DERMATOCHALASIS OF BOTH UPPER EYELIDS: ICD-10-CM

## 2023-11-21 DIAGNOSIS — H57.813 BROW PTOSIS, BILATERAL: Primary | ICD-10-CM

## 2023-11-21 DIAGNOSIS — H02.834 DERMATOCHALASIS OF BOTH UPPER EYELIDS: ICD-10-CM

## 2023-11-21 PROCEDURE — 92285 EXTERNAL OCULAR PHOTOGRAPHY: CPT | Mod: GC | Performed by: OPHTHALMOLOGY

## 2023-11-21 PROCEDURE — 92082 INTERMEDIATE VISUAL FIELD XM: CPT | Mod: GC | Performed by: OPHTHALMOLOGY

## 2023-11-21 PROCEDURE — 99204 OFFICE O/P NEW MOD 45 MIN: CPT | Mod: GC | Performed by: OPHTHALMOLOGY

## 2023-11-21 ASSESSMENT — VISUAL ACUITY
OS_SC: 20/20
METHOD: SNELLEN - LINEAR
OS_SC+: -1
OD_SC: 20/20
OD_SC+: -1

## 2023-11-21 ASSESSMENT — TONOMETRY
OD_IOP_MMHG: 9
OS_IOP_MMHG: 8
IOP_METHOD: ICARE

## 2023-11-21 ASSESSMENT — SLIT LAMP EXAM - LIDS
COMMENTS: HEAVY DERMATOCHALASIS RESTING ON LASHES
COMMENTS: HEAVY DERMATOCHALASIS RESTING ON LASHES

## 2023-11-21 ASSESSMENT — EXTERNAL EXAM - RIGHT EYE: OD_EXAM: BROW PTOSIS, WITH FRONTALIS RELAXED, BROW IS BELOW SUPERIOR ORBITAL RIM AND LATERALLY INLINE WITH UPPER LASHES

## 2023-11-21 ASSESSMENT — EXTERNAL EXAM - LEFT EYE: OS_EXAM: BROW PTOSIS, WITH FRONTALIS RELAXED, BROW IS BELOW SUPERIOR ORBITAL RIM AND LATERALLY INLINE WITH UPPER LASHES

## 2023-11-21 ASSESSMENT — CONF VISUAL FIELD
OD_SUPERIOR_NASAL_RESTRICTION: 3
OS_SUPERIOR_NASAL_RESTRICTION: 3
OS_SUPERIOR_TEMPORAL_RESTRICTION: 3
OD_SUPERIOR_TEMPORAL_RESTRICTION: 3

## 2023-11-21 NOTE — NURSING NOTE
Chief Complaints and History of Present Illnesses   Patient presents with    Consult For     Patient reports he is here by recommendation of Dr Smith due to Dermatochalasis OU      Chief Complaint(s) and History of Present Illness(es)       Consult For              Laterality: both eyes    Associated symptoms: dryness and redness.  Negative for tearing and discharge    Treatments tried: no treatments    Pain scale: 0/10    Comments: Patient reports he is here by recommendation of Dr Smith due to Dermatochalasis OU               Comments    The past 3 years has been bothered by both upper lids drooping. Things continue to get work and is not impacting work with vision decrease. 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 Has dark areas in his periphery  with lids in the way. Headache daily for the past year.   Has been Pre-Diabetic but due to wieght loss things have improved.     Lab Results       Component                Value               Date                       A1C                      7.1                 02/23/2019            Tata Henry, COT COT 7:32 AM November 21, 2023

## 2023-11-21 NOTE — TELEPHONE ENCOUNTER
Date Scheduled: 1-11-24  Facility: Surgery Locations: [facility]  Surgeon: Dr. Torres   Post-op appointment scheduled:    scheduled?: No  Surgery packet/instructions confirmed received?  Yes-maileld  Pre op physical/PAC appointment:   Special Considerations:     Lena Kaur  Surgery Scheduling Coordinator  Ph: [phone]

## 2023-11-21 NOTE — TELEPHONE ENCOUNTER
Message left for the patient to call back to get surgery scheduled with Dr. Torres.   Lena Kaur  Surgery Scheduling Coordinator  Ph: [phone]

## 2023-11-21 NOTE — PROGRESS NOTES
Oculoplastic Clinic New Patient    Patient: Mikey Sanders MRN# [id number]   YOB: 1965 Age: 58 year old   Date of Visit: Nov 21, 2023    CC: Droopy eyelids obstructing vision.              HPI:     Chief Complaint(s) and History of Present Illness(es)     Consult For            Laterality: both eyes    Associated symptoms: dryness and redness.  Negative for tearing and   discharge    Treatments tried: no treatments    Pain scale: 0/10    Comments: Patient reports he is here by recommendation of Dr Smith due to   Dermatochalasis OU           Comments    The past 3 years has been bothered by both upper lids drooping. Things   continue to get work and is not impacting work with vision decrease. Works   in dusting environment and dust coats lashes making them further close.   Has dark areas in his periphery  with lids in the way. Headache daily for   the past year.   Has been Pre-Diabetic but due to wieght loss things have improved. Told no longer diabetic. Last A1c was 6.0 7/7/2023.     Lab Results       Component                Value               Date                       A1C                      7.1                 02/23/2019            Tata Figueroa, COT COT 7:32 AM November 21, 2023        Mikey Sanders is a 58 year old male who has noted gradual onset of droopy eyelids over the past years. The droopy eyelid is interfering with activities of daily living including driving, and reading. The patient denies double vision, variability of the eyelid position. Mild dry eye symptoms.    EXAM:   MRD1: 1 mm each eye   Dermatochalasis with excess skin touching eyelashes   Brow ptosis with brow resting below superior orbital rim     VISUAL FIELD:  Right eye untaped:15 degrees Right eye taped:50 degrees  Left eye untaped:15 degrees Left eye taped:50 degrees    Assessment & Plan     Mikey Sanders is a 58 year old male with the following diagnoses:   1. Dermatochalasis of both upper eyelids    2.  Brow ptosis, bilateral    Both upper eyelid blepharoplasty (skin) 59233  Bilateral lateral direct brow 68284    Due to significant brow ptosis, blepharoplasty alone would be unsuccesfull in addressing the lateral hooding which is obstructing vision. Blepharoplasty alone would result in sewing very thin eyelid skin to thick sub-brow skin, and even with that, fail to address lateral hooding which is obstructing vision.          ANTICOAGULATION:  None     PHOTOS DEMONSTRATE:   Significant dermatochalasis with lids resting on eyelashes and obstructing visual axis  Brow ptosis with thicker brow skin and hairs below the lateral superior orbital rim    Gilda Lazo MD  Oculoplastic Surgery Fellow    Attending Physician Attestation: Complete documentation of historical and exam elements from today's encounter can be found in the full encounter summary report (not reduplicated in this progress note). I personally obtained the chief complaint(s) and history of present illness. I confirmed and edited as necessary the review of systems, past medical/surgical history, family history, social history, and examination findings as documented by others; and I examined the patient myself. I personally reviewed the relevant tests, images, and reports as documented above. I formulated and edited as necessary the assessment and plan and discussed the findings and management plan with the patient.  -Cecile Torres MD      Today with Mikey Sanders I reviewed the indications, risks, benefits, and alternatives of the proposed surgical procedure including, but not limited to, failure obtain the desired result  and need for additional surgery, bleeding, infection, loss of vision, loss of the eye, and the remote possibility of permanent damage to any organ system or death with the use of anesthesia.  I provided multiple opportunities for the questions, answered all questions to the best of my ability, and confirmed that my answers and  my discussion were understood.

## 2023-11-21 NOTE — LETTER
2023         RE:  :  MRN: Mikey Sanders  1965  [id number]     Dear Dr. Smith,    Thank you for asking me to see your patient, Mikey Sanders, for an oculoplastic   consultation.  My assessment and plan are below.  For further details, please see my attached clinic note.           HPI:     Chief Complaint(s) and History of Present Illness(es)     Consult For            Laterality: both eyes    Associated symptoms: dryness and redness.  Negative for tearing and   discharge    Treatments tried: no treatments    Pain scale: 0/10    Comments: Patient reports he is here by recommendation of Dr Smith due to   Dermatochalasis OU           Comments    The past 3 years has been bothered by both upper lids drooping. Things   continue to get work and is not impacting work with vision decrease. Works   in dusting environment and dust coats lashes making them further close.   Has dark areas in his periphery  with lids in the way. Headache daily for   the past year.   Has been Pre-Diabetic but due to wieght loss things have improved. Told no longer diabetic. Last A1c was 6.0 2023.     Lab Results       Component                Value               Date                       A1C                      7.1                 2019            Tata Figueroa, COT COT 7:32 AM 2023        Mikey Sanders is a 58 year old male who has noted gradual onset of droopy eyelids over the past years. The droopy eyelid is interfering with activities of daily living including driving, and reading. The patient denies double vision, variability of the eyelid position. Mild dry eye symptoms.    EXAM:   MRD1: 1 mm each eye   Dermatochalasis with excess skin touching eyelashes   Brow ptosis with brow resting below superior orbital rim     VISUAL FIELD:  Right eye untaped:15 degrees Right eye taped:50 degrees  Left eye untaped:15 degrees Left eye taped:50 degrees    Assessment & Plan     Mikey Sanders is a 58  year old male with the following diagnoses:   1. Dermatochalasis of both upper eyelids    2. Brow ptosis, bilateral    Both upper eyelid blepharoplasty (skin) 83155  Bilateral lateral direct brow 38414    Due to significant brow ptosis, blepharoplasty alone would be unsuccesfull in addressing the lateral hooding which is obstructing vision. Blepharoplasty alone would result in sewing very thin eyelid skin to thick sub-brow skin, and even with that, fail to address lateral hooding which is obstructing vision.          ANTICOAGULATION:  None      Again, thank you for allowing me to participate in the care of your patient.      Sincerely,    Cecile Torres MD  Department of Ophthalmology and Visual Neurosciences  [facility]    CC: Regine Smith, OD  [facility]  [address] [address]  Via Fax: [phone]

## 2024-01-10 ENCOUNTER — ANESTHESIA EVENT (OUTPATIENT)
Dept: SURGERY | Facility: AMBULATORY SURGERY CENTER | Age: 59
End: 2024-01-10
Payer: COMMERCIAL

## 2024-01-11 ENCOUNTER — ANESTHESIA (OUTPATIENT)
Dept: SURGERY | Facility: AMBULATORY SURGERY CENTER | Age: 59
End: 2024-01-11
Payer: COMMERCIAL

## 2024-01-11 ENCOUNTER — HOSPITAL ENCOUNTER (OUTPATIENT)
Facility: AMBULATORY SURGERY CENTER | Age: 59
Discharge: HOME | End: 2024-01-11
Attending: OPHTHALMOLOGY
Payer: COMMERCIAL

## 2024-01-11 VITALS
HEIGHT: 72 IN | OXYGEN SATURATION: 95 % | WEIGHT: 287 LBS | HEART RATE: 59 BPM | TEMPERATURE: 97.3 F | BODY MASS INDEX: 38.87 KG/M2 | RESPIRATION RATE: 16 BRPM | DIASTOLIC BLOOD PRESSURE: 80 MMHG | SYSTOLIC BLOOD PRESSURE: 141 MMHG

## 2024-01-11 DIAGNOSIS — H57.813 BROW PTOSIS, BILATERAL: Primary | ICD-10-CM

## 2024-01-11 PROCEDURE — 15823 BLEPHARP UPR EYELID XCSV SKN: CPT | Mod: 50 | Performed by: OPHTHALMOLOGY

## 2024-01-11 PROCEDURE — 67900 REPAIR BROW DEFECT: CPT | Mod: 50 | Performed by: OPHTHALMOLOGY

## 2024-01-11 PROCEDURE — 15823 BLEPHARP UPR EYELID XCSV SKN: CPT | Mod: RT

## 2024-01-11 PROCEDURE — 67900 REPAIR BROW DEFECT: CPT | Mod: LT

## 2024-01-11 RX ORDER — ONDANSETRON 4 MG/1
4 TABLET, ORALLY DISINTEGRATING ORAL EVERY 30 MIN PRN
Status: DISCONTINUED | OUTPATIENT
Start: 2024-01-11 | End: 2024-01-12 | Stop reason: HOSPADM

## 2024-01-11 RX ORDER — HYDROMORPHONE HYDROCHLORIDE 1 MG/ML
0.4 INJECTION, SOLUTION INTRAMUSCULAR; INTRAVENOUS; SUBCUTANEOUS EVERY 5 MIN PRN
Status: DISCONTINUED | OUTPATIENT
Start: 2024-01-11 | End: 2024-01-12 | Stop reason: HOSPADM

## 2024-01-11 RX ORDER — LIDOCAINE HYDROCHLORIDE 20 MG/ML
INJECTION, SOLUTION INFILTRATION; PERINEURAL PRN
Status: DISCONTINUED | OUTPATIENT
Start: 2024-01-11 | End: 2024-01-11

## 2024-01-11 RX ORDER — OXYCODONE HYDROCHLORIDE 5 MG/1
5 TABLET ORAL
Status: COMPLETED | OUTPATIENT
Start: 2024-01-11 | End: 2024-01-11

## 2024-01-11 RX ORDER — SODIUM CHLORIDE, SODIUM LACTATE, POTASSIUM CHLORIDE, CALCIUM CHLORIDE 600; 310; 30; 20 MG/100ML; MG/100ML; MG/100ML; MG/100ML
INJECTION, SOLUTION INTRAVENOUS CONTINUOUS
Status: DISCONTINUED | OUTPATIENT
Start: 2024-01-11 | End: 2024-01-11 | Stop reason: HOSPADM

## 2024-01-11 RX ORDER — ACETAMINOPHEN 325 MG/1
975 TABLET ORAL ONCE
Status: COMPLETED | OUTPATIENT
Start: 2024-01-11 | End: 2024-01-11

## 2024-01-11 RX ORDER — FENTANYL CITRATE 50 UG/ML
25 INJECTION, SOLUTION INTRAMUSCULAR; INTRAVENOUS EVERY 5 MIN PRN
Status: DISCONTINUED | OUTPATIENT
Start: 2024-01-11 | End: 2024-01-12 | Stop reason: HOSPADM

## 2024-01-11 RX ORDER — OXYCODONE HYDROCHLORIDE 5 MG/1
10 TABLET ORAL
Status: DISCONTINUED | OUTPATIENT
Start: 2024-01-11 | End: 2024-01-12 | Stop reason: HOSPADM

## 2024-01-11 RX ORDER — TETRACAINE HYDROCHLORIDE 5 MG/ML
SOLUTION OPHTHALMIC PRN
Status: DISCONTINUED | OUTPATIENT
Start: 2024-01-11 | End: 2024-01-11 | Stop reason: HOSPADM

## 2024-01-11 RX ORDER — SODIUM CHLORIDE, SODIUM LACTATE, POTASSIUM CHLORIDE, CALCIUM CHLORIDE 600; 310; 30; 20 MG/100ML; MG/100ML; MG/100ML; MG/100ML
INJECTION, SOLUTION INTRAVENOUS CONTINUOUS
Status: DISCONTINUED | OUTPATIENT
Start: 2024-01-11 | End: 2024-01-12 | Stop reason: HOSPADM

## 2024-01-11 RX ORDER — PROPOFOL 10 MG/ML
INJECTION, EMULSION INTRAVENOUS PRN
Status: DISCONTINUED | OUTPATIENT
Start: 2024-01-11 | End: 2024-01-11

## 2024-01-11 RX ORDER — ERYTHROMYCIN 5 MG/G
OINTMENT OPHTHALMIC
Qty: 3.5 G | Refills: 0 | Status: SHIPPED | OUTPATIENT
Start: 2024-01-11

## 2024-01-11 RX ORDER — ONDANSETRON 2 MG/ML
4 INJECTION INTRAMUSCULAR; INTRAVENOUS EVERY 30 MIN PRN
Status: DISCONTINUED | OUTPATIENT
Start: 2024-01-11 | End: 2024-01-12 | Stop reason: HOSPADM

## 2024-01-11 RX ORDER — OXYCODONE HYDROCHLORIDE 5 MG/1
5 TABLET ORAL EVERY 6 HOURS PRN
Qty: 12 TABLET | Refills: 0 | Status: SHIPPED | OUTPATIENT
Start: 2024-01-11 | End: 2024-01-14

## 2024-01-11 RX ORDER — FENTANYL CITRATE 50 UG/ML
50 INJECTION, SOLUTION INTRAMUSCULAR; INTRAVENOUS EVERY 5 MIN PRN
Status: DISCONTINUED | OUTPATIENT
Start: 2024-01-11 | End: 2024-01-12 | Stop reason: HOSPADM

## 2024-01-11 RX ORDER — LIDOCAINE 40 MG/G
CREAM TOPICAL
Status: DISCONTINUED | OUTPATIENT
Start: 2024-01-11 | End: 2024-01-11 | Stop reason: HOSPADM

## 2024-01-11 RX ORDER — ERYTHROMYCIN 5 MG/G
OINTMENT OPHTHALMIC PRN
Status: DISCONTINUED | OUTPATIENT
Start: 2024-01-11 | End: 2024-01-11 | Stop reason: HOSPADM

## 2024-01-11 RX ORDER — HYDROMORPHONE HYDROCHLORIDE 1 MG/ML
0.2 INJECTION, SOLUTION INTRAMUSCULAR; INTRAVENOUS; SUBCUTANEOUS EVERY 5 MIN PRN
Status: DISCONTINUED | OUTPATIENT
Start: 2024-01-11 | End: 2024-01-12 | Stop reason: HOSPADM

## 2024-01-11 RX ADMIN — PROPOFOL 40 MG: 10 INJECTION, EMULSION INTRAVENOUS at 10:59

## 2024-01-11 RX ADMIN — LIDOCAINE HYDROCHLORIDE 80 MG: 20 INJECTION, SOLUTION INFILTRATION; PERINEURAL at 10:55

## 2024-01-11 RX ADMIN — PROPOFOL 40 MG: 10 INJECTION, EMULSION INTRAVENOUS at 10:57

## 2024-01-11 RX ADMIN — PROPOFOL 20 MG: 10 INJECTION, EMULSION INTRAVENOUS at 10:55

## 2024-01-11 RX ADMIN — FENTANYL CITRATE 50 MCG: 50 INJECTION, SOLUTION INTRAMUSCULAR; INTRAVENOUS at 12:25

## 2024-01-11 RX ADMIN — FENTANYL CITRATE 50 MCG: 50 INJECTION, SOLUTION INTRAMUSCULAR; INTRAVENOUS at 12:06

## 2024-01-11 RX ADMIN — SODIUM CHLORIDE, SODIUM LACTATE, POTASSIUM CHLORIDE, CALCIUM CHLORIDE: 600; 310; 30; 20 INJECTION, SOLUTION INTRAVENOUS at 09:23

## 2024-01-11 RX ADMIN — OXYCODONE HYDROCHLORIDE 5 MG: 5 TABLET ORAL at 12:08

## 2024-01-11 RX ADMIN — ACETAMINOPHEN 975 MG: 325 TABLET ORAL at 09:23

## 2024-01-11 NOTE — OP NOTE
Oculoplastic Surgery Operative Note    PREOPERATIVE DIAGNOSIS:    1.  Bilateral brow ptosis  2.  Bilateral upper eyelid dermatochalasis     POSTOPERATIVE DIAGNOSIS:    1.  Bilateral brow ptosis  2.  Bilateral upper eyelid dermatochalasis    PROCEDURE:   1.  Both upper eyelid blepharoplasty  2.  Bilateral lateral direct brow plasty    ANESTHESIA: MAC with local infiltration of 2% Lidocaine with epinephrine and 0.5% Marcaine in 50:50 mixture    SURGEON:  Cecile Torres MD    ASSISTANT: None    ESTIMATED BLOOD LOSS: 5 mL    SPECIMEN: * No specimens in log *    INDICATIONS:  Mikey Sanders presented with both upper eyelid dermatochalasis and brow ptosis.  This was obscuring his peripheral vision. Risks, benefits, and alternatives to the proposed procedure were discussed, and he elected to proceed.    PROCEDURE: Mikey Sanders was brought to the operating room and placed supine on the operating table. Under monitored anesthesia care, the excess skin above the brows as well as both upper eyelids was marked out with a marking pen.  Both areas were infiltrated with local anesthetic as above.  He was prepped and draped in typical sterile fashion.  Attention was directed to the right eye.  Skin above the brow was incised with 15 blade and skin and dermis was excised with Brianda scissors.  Hemostasis was achieved with monopolar cautery.  Deep skin closure was achieved with 5-0 Monocryl sutures, and skin was closed with running 5-0 nylon sutures.  Attention was directed to the right upper eyelid where a 15 blade was used to incise skin and skin was excised with thermal cautery.  Orbital septum was opened and nasal central fat was very conservatively debulked.  Hemostasis was assured.  Skin was closed with a running 6-0 plain gut suture.  Attention was directed to the left side where the same procedure was performed.  The prep material was cleaned off, and erythromycin ophthalmic ointment was applied. Mikey Sanders  tolerated the procedure well and left the operating room in stable condition.       Cecile Torres MD   This report was dictated using Dragon voice recognition software.

## 2024-01-11 NOTE — DISCHARGE INSTRUCTIONS
Post-operative Instructions  Ophthalmic Plastic and Reconstructive Surgery    Cecile Torres M.D.     All instructions apply to the operated eye(s) or eyelid(s).    Wound care and personal care  ? Apply ice compresses and gentle pressure 15 minutes on, 15 minutes off, for 2 days. If you are sleeping, you don't need to wake up to ice. As long as there is no further bleeding, after two days, switch to warm water compresses for five minutes, four times a day until seen by your physician.   ? You may shower or wash your hair the day after surgery. Do not go swimming for at least 2 weeks to prevent contamination of your wounds.  ? You may go for walks and light activity is ok, but no heavy (over 15 pounds) lifting, bending or excessive straining for one week.   ? Do not apply make-up to the eyes or eyelids for 2 weeks after surgery.  ? Expect some swelling, bruising, black eye (even into the lower eyelids and cheeks). Also expect serum caking, crusting and discharge from the eye and/or incisions. A small amount of surface bleeding, and depending on the type of surgery, bleeding from the inside of the eyelid, is normal for the first 48 hours.  ? Avoid straining, bending at the waist, or lifting more than 15 pounds for 1 week. Sleeping with your head elevated, such as in a recliner, for the first several days can help swelling resolve more quickly.   ? Do continue to ambulate (walk) as you normally would - being sedentary after surgery can cause blood clots.   ? Your eye(s) and eyelid(s) may be painful and tender. This is normal after surgery.      Contact information and follow-up  ? Return to the Eye [facility] for a follow-up appointment with your physician as scheduled    ? For severe pain, bleeding, or loss of vision, call the [facility] at [phone].    After hours or on weekends and holidays, call [phone] and ask to speak with the ophthalmologist on call.    An on call person can be  reached after hours for concerns. The on call doctor should not call in medication refill requests after hours or on weekends, so please plan accordingly. An effort has been made to provide adequate pain medications following every surgery, and refills will not be provided in most instances.     Medications  ? Restart all regular home medications and eye drops. If you take Plavix or Aspirin on a regular basis, wait for 72 hours after your surgery before restarting these in order to decrease the risk of bleeding complications.  ? Avoid aspirin and aspirin-like medications (Motrin, Aleve, Ibuprofen, Tran-[place] etc) for 72 hours to reduce the risk of bleeding. You may take Tylenol (acetaminophen) for pain.  ? In addition to your home medications, take the following post-operative medications as prescribed by your physician.    ? Apply antibiotic ointment to all sutures three times a day, and into the operated eye(s) at night.  Use the prescribed ointment for your eyelid incisions, and over the counter antibiotic ointment such as bacitracin, or triple antibiotic, on your eyebrow incisions.   Once you run out, you can apply Vaseline or Aquaphor (over the counter) to the incisions. Don't put the Vaseline or Aquaphor into your eyes.   ? If you have ocular irritation, you can use over the counter artificial tears such as Refresh, Systane, or Blink. Do not use Visine, Clear Eyes, or any other drop that gets the red out.   ? Take antibiotic capsules or tablets as directed.  ? In many cases, postoperative discomfort can be managed with Tylenol alone. If narcotic pain medication was prescribed, take pain pills at prescribed frequency as needed for pain.  [facility]  Home Care Following Anesthesia  For 24 hours after surgery:  Get plenty of rest.  A responsible adult must stay with you for at least 24 hours after you leave the surgery center.  Do not drive or use heavy equipment.  If you  have weakness or tingling, don't drive or use heavy equipment until this feeling goes away.   Do not drink alcohol.   Avoid strenuous or risky activities.  Ask for help when climbing stairs.  You may feel lightheaded.  IF so, sit for a few minutes before standing.  Have someone help you get up.   If you have nausea (feel sick to your stomach): Drink only clear liquids such as apple juice, ginger ale, broth or 7-Up.  Rest may also help.  Be sure to drink enough fluids.  Move to a regular diet as you feel able.   You may have a slight fever.  Call the doctor if your fever is over 100 F (37.7 C) (taken under the tongue) or lasts longer than 24 hours.  You may have a dry mouth, a sore throat, muscle aches or trouble sleeping. These should go away after 24 hours.  Do not make important or legal decisions.   It is recommended to avoid smoking.               Tips for taking pain medications  To get the best pain relief possible, remember these points:  Take pain medications as directed, before pain becomes severe.  Pain medication can upset your stomach: taking it with food may help.  Constipation is a common side effect of pain medication. Drink plenty of  fluids.  Eat foods high in fiber. Take a stool softener if recommended by your doctor or pharmacist.  Do not drink alcohol, drive or operate machinery while taking pain medications.  Ask about other ways to control pain, such as with heat, ice or relaxation.    Tylenol/Acetaminophen Consumption    If you feel your pain relief is insufficient, you may take Tylenol/Acetaminophen in addition to your narcotic pain medication.   Be careful not to exceed 4,000 mg of Tylenol/Acetaminophen in a 24 hour period from all sources.  If you are taking extra strength Tylenol/acetaminophen (500 mg), the maximum dose is 8 tablets in 24 hours.  If you are taking regular strength acetaminophen (325 mg), the maximum dose is 12 tablets in 24 hours.    Call a doctor for any of the  following:  Signs of infection (fever, growing tenderness at the surgery site, a large amount of drainage or bleeding, severe pain, foul-smelling drainage, redness, swelling).  It has been over 8 to 10 hours since surgery and you are still not able to urinate (pass water).  Headache for over 24 hours.  Numbness, tingling or weakness the day after surgery (if you had spinal anesthesia).  Signs of Covid-19 infection (temperature over 100 degrees, shortness of breath, cough, loss of taste/smell, generalized body aches, persistent headache, chills, sore throat, nausea/vomiting/diarrhea)  Your doctor is:       Dr. Cecile Torres, Ophthalmology: [phone]               Or dial [phone] and ask for the resident on call for:  Ophthalmology  For emergency care, call the:  [facility]:  [phone] (TTY for hearing impaired: [phone])

## 2024-01-11 NOTE — ANESTHESIA POSTPROCEDURE EVALUATION
Patient: Mikey Sanders    Procedure: Procedure(s):  Both upper eyelid blepharoplasty and direct browplasty       Anesthesia Type:  MAC    Note:  Disposition: Outpatient   Postop Pain Control: Uneventful            Sign Out: Well controlled pain   PONV: No   Neuro/Psych: Uneventful            Sign Out: Acceptable/Baseline neuro status   Airway/Respiratory: Uneventful            Sign Out: Acceptable/Baseline resp. status   CV/Hemodynamics: Uneventful            Sign Out: Acceptable CV status; No obvious hypovolemia; No obvious fluid overload   Other NRE: NONE   DID A NON-ROUTINE EVENT OCCUR?            Last vitals:  Vitals Value Taken Time   /80 01/11/24 1234   Temp 36.3  C (97.3  F) 01/11/24 1234   Pulse 59 01/11/24 1234   Resp 16 01/11/24 1234   SpO2 95 % 01/11/24 1234       Electronically Signed By: Des Winston MD  January 11, 2024  1:46 PM

## 2024-01-11 NOTE — ANESTHESIA CARE TRANSFER NOTE
Patient: Mikey Sanders    Procedure: Procedure(s):  Both upper eyelid blepharoplasty and direct browplasty       Diagnosis: Brow ptosis, bilateral [H57.813]  Diagnosis Additional Information: No value filed.    Anesthesia Type:   MAC     Note:    Oropharynx: oropharynx clear of all foreign objects and spontaneously breathing  Level of Consciousness: awake  Oxygen Supplementation: room air    Independent Airway: airway patency satisfactory and stable  Dentition: dentition unchanged  Vital Signs Stable: post-procedure vital signs reviewed and stable  Report to RN Given: handoff report given  Patient transferred to: Phase II    Handoff Report: Identifed the Patient, Identified the Reponsible Provider, Reviewed the pertinent medical history, Discussed the surgical course, Reviewed Intra-OP anesthesia mangement and issues during anesthesia, Set expectations for post-procedure period and Allowed opportunity for questions and acknowledgement of understanding      Vitals:  Vitals Value Taken Time   BP     Temp     Pulse     Resp     SpO2         Electronically Signed By: FOREIGN Handy CRNA  January 11, 2024  11:53 AM

## 2024-01-11 NOTE — ANESTHESIA PREPROCEDURE EVALUATION
"Anesthesia Pre-Procedure Evaluation    Patient: Mikey Sanders   MRN: [id number] : 1965        Procedure : Procedure(s):  Both upper eyelid blepharoplasty and direct browplasty          Past Medical History:   Diagnosis Date    Esophagitis 2017    Gastritis 2017    Hypertensive emergency 2015      Past Surgical History:   Procedure Laterality Date    ELBOW SURGERY      Humerus/Elbow Procedure-ulnar nerve repair    FINGER SURGERY      Hand/Finger Procedure-x4    ORTHOPEDIC SURGERY      arm and hand surgery      Allergies   Allergen Reactions    Sucralfate Other (See Comments)     \"makes heart pound\", \"makes heart pound\", \"makes heart pound\"    Lansoprazole Palpitations     16 pt states not allergic, 16 pt states not allergic, 16 pt states not allergic, 16 pt states not allergic    Nizatidine Palpitations      Social History     Tobacco Use    Smoking status: Never    Smokeless tobacco: Never   Substance Use Topics    Alcohol use: Not Currently     Alcohol/week: 0.0 standard drinks of alcohol     Comment: last on , vodka      Wt Readings from Last 1 Encounters:   24 130.2 kg (287 lb)        Anesthesia Evaluation            ROS/MED HX  ENT/Pulmonary: Comment: [place]'s lung    (+) sleep apnea,                                       Neurologic:       Cardiovascular:     (+)  hypertension- -   -  - -                                      METS/Exercise Tolerance:     Hematologic:       Musculoskeletal:       GI/Hepatic:       Renal/Genitourinary:       Endo:     (+)  type II DM,             Obesity,       Psychiatric/Substance Use:     (+)     Recreational drug usage: Other (Comment).    Infectious Disease:       Malignancy:       Other:            Physical Exam    Airway        Mallampati: III       Respiratory Devices and Support         Dental       (+) Minor Abnormalities - some fillings, tiny chips      Cardiovascular          Rhythm and rate: regular     Pulmonary " "          breath sounds clear to auscultation           OUTSIDE LABS:  CBC:   Lab Results   Component Value Date    WBC 8.9 06/18/2021    WBC 6.3 04/09/2021    HGB 15.4 06/18/2021    HGB 16.0 04/09/2021    HCT 46.0 06/18/2021    HCT 48.0 04/09/2021     06/18/2021     04/09/2021     BMP:   Lab Results   Component Value Date     06/18/2021     04/09/2021    POTASSIUM 4.5 06/18/2021    POTASSIUM 4.3 04/09/2021    CHLORIDE 112 (H) 06/18/2021    CHLORIDE 108 (H) 04/09/2021    CO2 25 06/18/2021    CO2 22 04/09/2021    BUN 15 06/18/2021    BUN 27 (H) 04/09/2021    CR 1.13 06/18/2021    CR 1.09 04/09/2021     06/18/2021     04/09/2021     COAGS: No results found for: \"PTT\", \"INR\", \"FIBR\"  POC:   Lab Results   Component Value Date     (H) 02/26/2019     HEPATIC:   Lab Results   Component Value Date    ALBUMIN 4.1 06/18/2021    PROTTOTAL 7.2 06/18/2021    ALT 38 06/18/2021    AST 30 06/18/2021    ALKPHOS 80 06/18/2021    BILITOTAL 0.3 06/18/2021     OTHER:   Lab Results   Component Value Date    LACT 0.8 06/18/2021    A1C 7.1 (H) 02/23/2019    ISA 9.8 06/18/2021    LIPASE 18 06/18/2021    CRP 0.6 04/09/2021       Anesthesia Plan    ASA Status:  3    NPO Status:  NPO Appropriate    Anesthesia Type: MAC.     - Reason for MAC: immobility needed              Consents    Anesthesia Plan(s) and associated risks, benefits, and realistic alternatives discussed. Questions answered and patient/representative(s) expressed understanding.     - Discussed:     - Discussed with:  Patient            Postoperative Care            Comments:               Des Winston MD    I have reviewed the pertinent notes and labs in the chart from the past 30 days and (re)examined the patient.  Any updates or changes from those notes are reflected in this note.              # Obesity: Estimated body mass index is 38.92 kg/m  as calculated from the following:    Height as of this encounter: 1.829 m " (6').    Weight as of this encounter: 130.2 kg (287 lb).

## 2024-01-18 ENCOUNTER — OFFICE VISIT (OUTPATIENT)
Dept: OPHTHALMOLOGY | Facility: CLINIC | Age: 59
End: 2024-01-18
Payer: COMMERCIAL

## 2024-01-18 DIAGNOSIS — H57.813 BROW PTOSIS, BILATERAL: Primary | ICD-10-CM

## 2024-01-18 DIAGNOSIS — H02.831 DERMATOCHALASIS OF BOTH UPPER EYELIDS: ICD-10-CM

## 2024-01-18 DIAGNOSIS — H02.834 DERMATOCHALASIS OF BOTH UPPER EYELIDS: ICD-10-CM

## 2024-01-18 PROCEDURE — 99024 POSTOP FOLLOW-UP VISIT: CPT | Performed by: OPHTHALMOLOGY

## 2024-01-18 ASSESSMENT — VISUAL ACUITY
OS_CC: 20/20
OD_CC+: -1
OS_CC+: -1
OD_CC: 20/20
METHOD: SNELLEN - LINEAR

## 2024-01-18 ASSESSMENT — CONF VISUAL FIELD
OD_SUPERIOR_NASAL_RESTRICTION: 0
OS_SUPERIOR_TEMPORAL_RESTRICTION: 0
OD_INFERIOR_NASAL_RESTRICTION: 0
OS_NORMAL: 1
OD_NORMAL: 1
OD_SUPERIOR_TEMPORAL_RESTRICTION: 0
OS_INFERIOR_TEMPORAL_RESTRICTION: 0
OS_INFERIOR_NASAL_RESTRICTION: 0
OS_SUPERIOR_NASAL_RESTRICTION: 0
OD_INFERIOR_TEMPORAL_RESTRICTION: 0

## 2024-01-18 ASSESSMENT — TONOMETRY
OS_IOP_MMHG: 09
OD_IOP_MMHG: 10
IOP_METHOD: ICARE

## 2024-01-18 NOTE — PROGRESS NOTES
"Chief Complaint(s) and History of Present Illness(es)     Follow Up For Surgery Of Eye            Laterality: both eyes    Pain scale: 0/10          Comments    S/p BULB patient states he is seeing the world better. Patient still using   ointment.  .kad               Doing well. Happy with outcome. Looks great! Sutures removed.     Patient Instructions   - Apply warm compresses for 1 minute two to three times a day until your bruising has resolved. You can continue this for one more month.   - Cool compresses can help with swelling and itching, you can alternate cool compresses with warm compresses if you find it helpful.  - Apply Aquaphor or Vaseline to the incision at bedtime until it is smooth.    - If you have symptoms of eye irritation, it is good to use over the counter artificial tears. Good brands include Refresh, Blink, and Systane. Do NOT get drops that are for \"red eyes.\"   - It is normal for the incision to appear raised, red, itch and have small lumps. You can gently massage any small bumps along the incision line. These can take up to six months to resolve.    Return if symptoms worsen or fail to improve.      Attending Physician Attestation: Complete documentation of historical and exam elements from today's encounter can be found in the full encounter summary report (not reduplicated in this progress note). I personally obtained the chief complaint(s) and history of present illness. I confirmed and edited as necessary the review of systems, past medical/surgical history, family history, social history, and examination findings as documented by others; and I examined the patient myself. I personally reviewed the relevant tests, images, and reports as documented above. I formulated and edited as necessary the assessment and plan and discussed the findings and management plan with the patient and family. I personally reviewed the ophthalmic test(s) associated with this encounter, agree with the " interpretation(s) as documented by the resident/fellow, and have edited the corresponding report(s) as necessary. Cecile Torres MD

## 2024-01-18 NOTE — NURSING NOTE
Chief Complaints and History of Present Illnesses   Patient presents with    Follow Up For Surgery Of Eye     Chief Complaint(s) and History of Present Illness(es)       Follow Up For Surgery Of Eye              Laterality: both eyes    Pain scale: 0/10              Comments    S/p BULB patient states he is seeing the world better. Patient still using ointment.  .kad

## 2024-02-25 ENCOUNTER — HEALTH MAINTENANCE LETTER (OUTPATIENT)
Age: 59
End: 2024-02-25

## 2024-07-13 ENCOUNTER — HEALTH MAINTENANCE LETTER (OUTPATIENT)
Age: 59
End: 2024-07-13

## 2024-11-30 ENCOUNTER — HEALTH MAINTENANCE LETTER (OUTPATIENT)
Age: 59
End: 2024-11-30

## 2025-03-09 ENCOUNTER — HEALTH MAINTENANCE LETTER (OUTPATIENT)
Age: 60
End: 2025-03-09

## 2025-06-28 ENCOUNTER — HEALTH MAINTENANCE LETTER (OUTPATIENT)
Age: 60
End: 2025-06-28

## (undated) DEVICE — ESU HIGH TEMP LOOP TIP AA03

## (undated) DEVICE — ESU GROUND PAD ADULT W/CORD E7507

## (undated) DEVICE — EYE PREP BETADINE 5% SOLUTION 30ML 0065-0411-30

## (undated) DEVICE — SU ETHILON 5-0 P-3 18" BLACK 698G

## (undated) DEVICE — GLOVE BIOGEL PI MICRO SZ 7.5 48575

## (undated) DEVICE — LINEN TOWEL PACK X5 5464

## (undated) DEVICE — PACK MINOR EYE CUSTOM ASC

## (undated) DEVICE — SU MONOCRYL 5-0 P-3 18" UND Y493G

## (undated) RX ORDER — OXYCODONE HYDROCHLORIDE 5 MG/1
TABLET ORAL
Status: DISPENSED
Start: 2024-01-11

## (undated) RX ORDER — FENTANYL CITRATE 50 UG/ML
INJECTION, SOLUTION INTRAMUSCULAR; INTRAVENOUS
Status: DISPENSED
Start: 2024-01-11

## (undated) RX ORDER — PROPOFOL 10 MG/ML
INJECTION, EMULSION INTRAVENOUS
Status: DISPENSED
Start: 2024-01-11

## (undated) RX ORDER — ACETAMINOPHEN 325 MG/1
TABLET ORAL
Status: DISPENSED
Start: 2024-01-11